# Patient Record
Sex: MALE | Race: WHITE | Employment: OTHER | URBAN - METROPOLITAN AREA
[De-identification: names, ages, dates, MRNs, and addresses within clinical notes are randomized per-mention and may not be internally consistent; named-entity substitution may affect disease eponyms.]

---

## 2021-05-11 ENCOUNTER — HOSPITAL ENCOUNTER (OUTPATIENT)
Dept: PHYSICAL THERAPY | Facility: CLINIC | Age: 71
Setting detail: THERAPIES SERIES
Discharge: HOME | End: 2021-05-11
Payer: MEDICARE

## 2021-05-11 PROCEDURE — 97110 THERAPEUTIC EXERCISES: CPT

## 2021-05-11 PROCEDURE — 97161 PT EVAL LOW COMPLEX 20 MIN: CPT

## 2021-05-11 NOTE — FLOWSHEET NOTE
Melissa Fall Risk Assessment    Patient Name:  Shannan Paul  : 1950        Risk Factor Scale  Score   History of Falls [] Yes  [x] No 25  0    Secondary Diagnosis [] Yes  [x] No 15  0    Ambulatory Aid [] Furniture  [] Crutches/cane/walker  [x] None/bedrest/wheelchair/nurse 30  15  0    IV/Heparin Lock [] Yes  [x] No 20  0    Gait/Transferring [] Impaired  [x] Weak  [] Normal/bedrest/immobile 20  10  0 10   Mental Status [] Forgets limitations  [x] Oriented to own ability 15  0       Total:10     Based on the Assessment score: check the appropriate box.     [x]  No intervention needed   Low =   Score of 0-24    []  Use standard prevention interventions Moderate =  Score of 24-44   [] Give patient handout and discuss fall prevention strategies   [] Establish goal of education for patient/family RE: fall prevention strategies    []  Use high risk prevention interventions High = Score of 45 and higher   [] Give patient handout and discuss fall prevention strategies   [] Establish goal of education for patient/family Re: fall prevention strategies   [] Discuss lifeline / other resources    Electronically signed by:   Ramon Hutchison, PT  Date: 2021

## 2021-05-11 NOTE — CONSULTS
[] [facility]) - [facility] &  Therapy  [address]  [phone]  F: [phone] [] [address]  [address]   [address]  P: [phone]  F: [phone] [] [address] &  Therapy  [address]  P: [phone]  F: [phone] [] [address]  P: [phone]  F: [phone] [] [address]  [facility]   [address]   [place]: [phone]  F: [phone]      Physical Therapy Neurological Evaluation    Date:  2021  Patient: Herber Beasley   : 1950  MRN: [id number]  Physician: Dr Simran Couch: Medicare  Medical Diagnosis: CVA w/ R LE an UE weakness  Rehab Codes: R29.898  Onset date: 21    Next 's appt. : --    Subjective:   CC:R UE and LE weakness   HPI: : sitting outside cooking dinner on the grill. Got up and noticed he couldn't  very well. Also noticed R leg \"lagged\" behind the L. He thought it was low blood sugar. Went to bed that night and nothing had changed overnight. Called PCP and went to hospital asap.  + CVA. Had home PT and OT for 2 wks. Hand was affected with fine motor skills, writing, .  R leg was limited by proximal R hip. He is extimated at 90% back to normal by the [place], which he agrees with. Endurance is the primary limitation with respect to the R LE. Has always enjoyed walking. Can currently walk 1/2 block (10-15 min). Normally go 2 blocks. Most of the difficulty is with the 3 and 4 fingers. Squeezing silly putty. As for the cause, plaque had broken off and traveled to L frontal lobe.       PMHx: [] Unremarkable [] Diabetes [x] HTN-controlled     [] Pacemaker   [] MI/Heart Problems [x] Cancer (melanoma 2005) [x] Arthritis-back, Jeremiah shoulders R is bone on bone, limited flexion to 90 deg, had considered TSA [] Other:              [x] Refer to full medical chart  In EPIC       Comorbidities:   [x] Obesity [] Dialysis  [] N/A   [] Asthma/COPD [] Dementia [] Other:   [] Stroke [] Sleep apnea [] Other:   [] Vascular disease [] Rheumatic disease [] Other:     Tests: [] X-Ray: [] MRI:  [] Other:    Medications: [x] Refer to full medical record [] None [] Other:  Allergies:      [x] Refer to full medical record [] None [] Other:    Function:  Hand Dominance  [x] Right  [] Left  Patient lives with: In what type of home []  One story   [] Two story   [] Split level   Number of stairs to enter    With handrail on the []  Right to enter   [] Left to enter   Bathroom has a []  Tub only  [] Tub/shower combo   [] Walk in shower    []  Grab bars   Washing machine is on []  Main level   [] Second level   [] Basement   Employer    Job Status []  Normal duty   [] Light duty   [] Off due to condition    [x]  Retired   [] Not employed   [] Disability  [] Other:  []  Return to work:    Work activities/duties Walking, craft/hobby work       ADL/IADL Previous level of function Current level of function Who currently assists the patient with task   Bathing  [] Independent  [] Assist [x] Independent  [] Assist    Dress/grooming [] Independent  [] Assist [x] Independent  [] Assist    Transfer/mobility [] Independent  [] Assist [x] Independent  [] Assist    Feeding [] Independent  [] Assist [x] Independent  [] Assist    Toileting [] Independent  [] Assist [x] Independent  [] Assist    Driving [] Independent  [] Assist [x] Independent  [] Assist    Housekeeping [] Independent  [] Assist [x] Independent  [] Assist    Grocery shop/meal prep [] Independent  [] Assist [x] Independent  [] Assist      Gait Prior level of function Current level of function    [] Independent  [] Assist [x] Independent  [] Assist   Device: [] Independent [x] Independent    [] Straight Cane [] Quad cane [] Straight Cane [] Quad cane    [] Standard walker [] Rolling walker   [] 4 wheeled walker [] Standard walker [] Rolling walker   [] 4 wheeled walker    [] Wheelchair [] Wheelchair     Pain:  [] Yes  [x] No Location: Pain Rating: (0-10 scale) ---/10  Pain altered Tx:  [] Yes  [x] No  Action:    Symptoms:  [x] Improving [] Worsening [] Same  Better:  [] AM    [] PM    [] Sit    [] Rise/Sit    []Stand    [] Walk    [] Lying    [] Other:  Worse: [] AM    [] PM    [] Sit    [] Rise/Sit    []Stand    [] Walk    [] Lying    [] Bend                      [] Valsalva    [] Other:  Sleep: [x] OK    [] Disturbed    Objective:    ROM  °A/P STRENGTH POSTURE No deficit Deficit Not Tested    Left Right Left Right Kyphosis [] [] []   Shoulder Flex wfl 90   Scoliosis [] [] []   Abd     Forward Head [] [] []   Elbow Flex     Rounded Shldrs [] [] []   Ext     Slumped Sitting [] [] []   Wrist Flex     Skin Integrity [] [] []   Ext           Hand      NEUROLOGICAL      Hip Flex  90   Reflexes [] [] []   Ext  5   Sensation [] [] []   Abd  15   Bladder/Bowel [] [] []   Hip ER  15   Coordination [] [] []   Hip IR  15   Tone [] [] []   Ankle DF     Clonus [] [] []   PF     Tremors [] [] []     FUNCTION INDEP MIN ASSIST MOD ASSIST MAX ASSIST NOT TESTED   Bed Mobility             -rolling []  []  []  []  []    -sit to supine []  []  []  []  []    -supine to sit []  []  []  []  []    Transfers             -sit to stand []  []  []  []  []    -sliding board []  []  []  []  []    -pivot []  []  []  []  []    Balance             -sitting static []  []  []  []  []    -dynamic []  []  []  []  []    -standing static []  []  [x]  []  []    -dynamic []  []  []  []  []    Ambulation []  []  []  []  []    Distance/Device:    Analysis:     W/C Mobility []  []  []  []  []    Groom/Dress []  []  []  []  []             Functional Test: LEFS Score: 38% functionally impaired     Comments:    Assessment:  Patient would benefit from skilled physical therapy services in order to: improve R LE strength and endurance    Problems:    [] ? Pain:  [] ? ROM:  [x] ? Strength:  [x] ? Function:  [] Other:       STG: (to be met in 4 treatments)  1. ? Strength:able to walk 1 block 3x/wk  2. ? Function: improve LEFS <20% interference  3. Patient to be independent with home exercise program as demonstrated by performance with correct form without cues. LTG: (to be met in 8 treatments)  1. Able to walk 2 blocks without issues  2. LEFS <10% interference      Patient goals: \"strengthen hip\"    Rehab Potential:  [x] Good  [] Fair  [] Poor   Suggested Professional Referral:  [x] No  [] Yes:  Barriers to Goal Achievement:  [x] No  [] Yes:  Domestic Concerns:  [x] No  [] Yes:    Pt. Education:  [x] Plans/Goals, Risks/Benefits discussed  [] Home exercise program  Method of Education: [x] Verbal  [] Demo  [] Written  Comprehension of Education:  [] Verbalizes understanding. [] Demonstrates understanding. [x] Needs Review. [] Demonstrates/verbalizes understanding of HEP/Ed previously given. Treatment Plan:  [x] Therapeutic Exercise   15195  [] Iontophoresis: 4 mg/mL Dexamethasone Sodium Phosphate  mAmin  50103   [] Therapeutic Activity  34605 [] Vasopneumatic cold with compression  00392    [] Gait Training   53307 [] Ultrasound   38992   [x] Neuromuscular Re-education  06297 [] Electrical Stimulation Unattended  89429   [x] Manual Therapy  14290 [] Electrical Stimulation Attended  34680   [x] Instruction in HEP  [] Lumbar/Cervical Traction  58717   [] Aquatic Therapy   84584 [] Cold/hotpack    [] Massage   92112      [] Dry Needling, 1 or 2 muscles  01557   [] Biofeedback, first 15 minutes   22901  [] Biofeedback, additional 15 minutes   82818 [] Dry Needling, 3 or more muscles  45055     []  Medication allergies reviewed for use of    Dexamethasone Sodium Phosphate 4mg/ml     with iontophoresis treatments. Pt is not allergic.       Frequency: 2x/weeks for 8 visits    [place] Treatment:  Modalities: none  Precautions: R shoulder limited ROM and is bone on bone. Limited R hip ROM. Arthritis in his back  Exercises:  Exercise Reps/ Time Weight/ Level Issued to HEP Completed Comments   Tasia 5'   x            Supine        Bridges *       SL hip abd *               Gym        Total Gym R squats 2x10 L13  x 2 legs/R leg   // bar ex *       Lunges 2x6   x    Lateral step ups *       Posterior step ups *       Heel taps *       Balance  *       4 way hp  2x10 blue  x    Other:    Specific Instructions for next treatment: add ex for balance, strength, and endurance to R LE    Evaluation Complexity:  History (Personal factors, comorbidities) [x] 0 [] 1-2 [] 3+   Exam (limitations, restrictions) [x] 1-2 [] 3 [] 4+   Clinical presentation (progression) [x] Stable [] Evolving  [] Unstable   Decision Making [x] Low [] Moderate [] High    [x] Low Complexity [] Moderate Complexity [] High Complexity       Treatment Charges: Mins Units   [x] Evaluation       [x]  Low       []  Moderate       []  High  1   []  Modalities     [x]  Ther Exercise 20 1   []  Manual Therapy     []  Ther Activities     []  Aquatics     []  Vasocompression     []  Other       TOTAL TREATMENT TIME: 60    Time in:1403      Time out: 1510    Electronically signed by: Keesha Middleton PT        Physician Signature:________________________________Date:__________________  By signing above or cosigning this note, I have reviewed this plan of care and certify a need for medically necessary rehabilitation services.      *PLEASE SIGN ABOVE AND FAX BACK ALL PAGES*

## 2021-05-11 NOTE — CARE COORDINATION
Medicare Cap   [x] Physical Therapy  [] Speech Therapy  [] Occupational therapy  *PT and Speech caps combine      $2100 Limit for PT and Speech combined  $2100 Limit for OT individually  At the beginning of the month where you expect to go over $2100, please add the [address] [place] modifier      Patient Name: Patricia Elliott  YOB: 1950    Note:  This is an estimate of charges billed.      Date of [address] Name # units/ charge $$$ charge Daily Total Charge Ongoing Total $$$   5/1121 libertad ARITA 1/1 82.82+23.22 106.04 106.04

## 2021-05-13 ENCOUNTER — HOSPITAL ENCOUNTER (OUTPATIENT)
Dept: OCCUPATIONAL THERAPY | Facility: CLINIC | Age: 71
Setting detail: THERAPIES SERIES
Discharge: HOME | End: 2021-05-13
Payer: MEDICARE

## 2021-05-13 PROCEDURE — 97110 THERAPEUTIC EXERCISES: CPT

## 2021-05-13 PROCEDURE — 97165 OT EVAL LOW COMPLEX 30 MIN: CPT

## 2021-05-13 NOTE — CONSULTS
[] [facility] Outpatient       Occupational Therapy 1st floor       [address], [place]         Phone: [phone]       Fax: [phone] [x] [place] [address] Occupational Therapy  [address], [place]  Phone: [phone]  Fax: [phone]       Occupational Therapy Hand & Upper Extremity  Initial Evaluation    Date: 2021      Patient: Rabia Hutchinson  : 1950  MRN: [id number]  Referring Provider:  Lesa Guy MD  Insurance: Medicare - BMN, calendar year, [facility] supplement - follow all medicare guidelines, dual insurance, no pt liability. Medical Diagnosis: Weakness of extremity, M62.81.  (right upper extremity)   Rehab Codes: Fine motor skills loss R29.818, muscle weakness generalized M62.81. Onset Date: 21    Next Dr. Susan Deras: 21  #Visits/Total Visits: 1/4  1 time a week for 4 visits. Cancels/No Shows:  0/0    Subjective:   CC: Have trouble with positioning eating utensils, painting, legibility of writing, putting on left sock, slower doing things than normal.  HPI: (onset date: 21)  Ischemic stroke by pt report, affecting right side. Leg gets fatigued before the arm. Surgery Date: NA      Involved Extremity: Right   Dominant Extremity: Right    Past Medical History: Diabetes, Cancer, HTN and Arthritis          Comorbidities: Obesity and Stroke    Medications: Metformin, Latanoprost, Lisinopril, Lipitor, Plavix. Allergies:  None    Pain: Intensity:  0 /10 Location: NA     Pain Type: NA  Pain Altered Tx: NA  Action Taken: NA            Home Environment:    Pt lives in a House With 3 [address]HE[address] and R Handrail   The washing machine is on the main level    Vocation NA   Job Status []Normal duty []Light duty [] Off due to condition [x]Retired  []Not employed []Disability  []Other:  []  Return to work:    Work activities/duties      Avocational Activities Fishing, boating, walking, castro, bird watching, restoring [employer]    [] Young Children     [x] Grandparenting - 3, ages 4-5, out of town     [] Care giver [] OTHER    [] NA       ADL/IADL Previous level of function Current level of function Who assists or modifications made or needed   Bathing  [x] Independent    [] Assist  [x] Independent    [] Assist     Dress/grooming [x] Independent    [] Assist  [] Independent    [x] Assist  Wife - Occasional assistance with a sock due to time issue. Transfer/mobility [x] Independent    [] Assist  [x] Independent    [] Assist     Feeding [x] Independent    [] Assist  [x] Independent    [] Assist     Toileting [x] Independent    [] Assist  [x] Independent    [] Assist     Driving [x] Independent    [] Assist  [x] Independent    [] Assist     Housekeeping [x] Independent    [] Assist  [x] Independent    [] Assist     Grocery shop/meal prep [x] Independent    [] Assist  [x] Independent    [] Assist       Device: Independent   Orthosis: NA    Objective: Tests/Measurements:                                     Torres Fall Risk Assessment  Risk Factor Scale  Score   History of Falls [] Yes  [x] No 25  0 0   Secondary Diagnosis [] Yes  [x] No 15  0 0   Ambulatory Aid [] Furniture  [] Crutches/cane/walker  [x] None/bedrest/wheelchair/nurse 30  15  0 0   IV/Heparin Lock [] Yes  [x] No 20  0 0   Gait/Transferring [] Impaired  [] Weak  [x] Normal/bedrest/immobile 20  10  0 0   Mental Status [] Forgets limitations  [x] Oriented to own ability 15  0 0      Total:  0     Based on the Assessment score: check the appropriate box. [x]  No intervention needed   Low =   Score of 0-24      Upper Extremity Functional Index:  Current Functional Level:  71 points, mild functional impairment as measured with the Upper Extremity Functional Index Survey. 0-80 scale, with 80 = no Deficits  (The UEFI model does not provide any specific cut off points that could classify the upper limb disability degree, however, a minimal detectable change of 9 points is provided. This means that for improvement or deterioration to be considered, between two subsequent evaluations, the scores must differ by at least 9 points.)    STRENGTH:    Pounds RIGHT LEFT    74 76.6   Lateral pinch 23 24   2 point pinch 16 16   3 jaw pinch 20 20     The affected  is 3.4 % weaker than the unaffected . (affected score/unaffected score, take the total and subtract from 100)    Manual Muscle Test, Right Upper Extremity:   5-/5 shoulder and elbow flexion, 4/5 all right finger ab- and adduction. Remainder of right upper extremity 5/5 (WNL). COORDINATION  - Fine Motor (speed/dexterity):   Right in seconds Percentile Left in seconds Percentile   9 Hole Peg Test 29.50  Greater than the 10th percentile 23.41  Greater than the 75th percentile       AROM:  DIGITS   Right       Extension/Flexion  Degrees INDEX MIDDLE RING LITTLE   MCP -10/WNL +12/WNL   -5/WNL +10/WNL   PIP   -5/WNL    -2/WNL -15/WNL  -20/WNL   DIP    0/WNL    -4/WNL   -3/WNL   +9/WNL    Full fist Full fist Full Fist Full fist   Extensor lag noted in index and ring MCP joints, all PIP joints, and the DIP joints of the middle and ring fingers. AROM: Right elbow and forearm WNL. Right shoulder limited ROM and is bone on bone from unrelated condition    Sensibility: Normal    Edema: None      Skin Color: Slight pink mottling of palm Skin/Scar condition Not tested    Problems: ROM, Strength, Function and Coordination     Assessment:  Patient would benefit from skilled occupational therapy services in order to: Improve and Increase  ROM, Strength, Activity tolerance and Coordination deficit in order to improve functional use of UE in ADL performance and return to PLOF. Short Term Goals: (  4    Treatments)  1. Increase AROM, Right:  a. Index and ring MCP joint extension will be 0 degrees (WNL). b. Index and middle finger PIP extension will be 0 degrees (WNL)  c. Ring and little PIP extension will be -5 or less degrees.   d. Middle and ring DIP extension will be 0 degrees (WNL). 2. Increase strength (pounds): Right  strength will be 77 or more pounds or symmetrical with the left . 3. Increase function: UE Functional Index Score will be 78 or more points to promote increased functional abilities. 4. Patient to be independent with home exercise program as demonstrated by performance with correct form without cues. Long Term Goals: NA    Patient Goals: Fine motor skills - hand. Treatment Potential: Good   Suggested Professional Referral: No  Domestic Concerns: No   Barriers to Goal Achievement: No    Comments/Assessment:  Pt is s/p CVA 04/05/21, approximately 5.5 weeks ago. Pt referred to occupational therapy services for  therapy for fine motor skills and writing. He estimates he has recovered 90%. Physical therapy is addressing right hip issues. Right affected  strength is 3.4% weaker than the unaffected left . Pinches are symmetrical or within 1 pound bilaterally. Manual muscle test of the right upper extremity unremarkable except finger intrinsic muscles (digit ab- and adduction) which test at 4/5. Right hand fine motor coordination as measured with the 9 Hole Peg test at greater than the 10th percentile, which is significantly lower than the left  Hand at greater than the 75th percentile. Unremarkable for sensory deficits and edema of the right upper extremity.   Plan is to see pt once weekly for 4 visits, with emphasis on home exercise program for fine motor and strengthening program.    Home Program Initiated: Written, Verbal and Demo Comprehension of Education: Yes       Plans, Goals, Risks, Benefits, Discussed with and Patient    Treatment Plan:  Therapeutic Ex 07214, Manual 32768 and Ultrasound [id number]    Treatment Plan:  Frequency: 1 x/weeks for 4 visits     Today's Treatment:  Modalities:                Precautions: R shoulder limited ROM and is bone on bone    Exercise Flow Sheet:  Exercise Reps/Time Weight/Level Comments   Fine motor/dexterity exercises   Completed  Pt education provided: written, verbal, demo. Resistive /pinches with putty 10 reps each [place] Completed  Pt education provided: written, verbal, demo. Functional fine motor ex, including timed ex as appropriate   Planned for next session                     Other: NA    Specific Instructions for Next Treatment: Review home ex program, initiate functional fine motor ex for clinic program.    Evaluation Complexity:  History (Personal factors, comorbidities) [x]  0 []  1-2 []  3+   Exam (limitations, restrictions) [x]  1-2 []  3 []  4+   Decision Making [x]  Low []  Moderate []  High   ? [x]  Low Complexity []  Moderate   Complexity []  High Complexity      Treatment Charges: Mins Units Time In/Out   Evaluation  []  High  []  Moderate  [x]  Low  28  1 1050 - 1118   [x]  Ther Exercise 53 4 1118 - 1211   []  Manual Therapy      []  Ther Activities      []  Orthotic fit/train      []  Orthotic recheck      []        Total Treatment time 81 min             Medicare Cap   [] Physical Therapy  [] Speech Therapy  [x] Occupational therapy  *PT and Speech caps combine      $2100 Limit for PT and Speech combined  $2100 Limit for OT individually  At the beginning of the month where you expect to go over $2100, please add the [address] [place] modifier      Patient Name: Gail Vargas  YOB: 1950    Note:  This is an estimate of charges billed. Date of [facility] 63 Name # units/ charge $$$ charge Daily Total Charge Ongoing Total $$$   05/13/21 OT Eval - Low 1 87.69      Th Ex  4 23.22 x 4 180.57 180.57                       Time In/Time Out: 1050 - 1211       Electronically signed by CAROLINE Landon/L, CHT on 5/13/2021 at 11:01 AM    Physician Signature: _________________________ Date: _______________  By signing above or cosigning this note, I have reviewed this plan of care and certify a need for medically necessary rehabilitation services.      *PLEASE SIGN ABOVE AND

## 2021-05-18 ENCOUNTER — HOSPITAL ENCOUNTER (OUTPATIENT)
Dept: PHYSICAL THERAPY | Facility: CLINIC | Age: 71
Setting detail: THERAPIES SERIES
Discharge: HOME | End: 2021-05-18
Payer: MEDICARE

## 2021-05-18 PROCEDURE — 97110 THERAPEUTIC EXERCISES: CPT

## 2021-05-18 NOTE — FLOWSHEET NOTE
Endurance to ex was good. Transfers were limited due to decreased core strength and overall deconditioning. [] No change. [] Other:  [] Patient would continue to benefit from skilled physical therapy services in order to:     STG/LTG  STG: (to be met in 4 treatments)  1. ? Strength:able to walk 1 block 3x/wk  2. ? Function: improve LEFS <20% interference  3. Patient to be independent with home exercise program as demonstrated by performance with correct form without cues.     LTG: (to be met in 8 treatments)  1. Able to walk 2 blocks without issues  2. LEFS <10% interference      Patient goals: \"strengthen hip\"    Pt. Education:  [x] Yes  [] No  [] Reviewed Prior HEP/Ed  Method of Education: [] Verbal  [x] Demo  [x] Written  Comprehension of Education:  [] Verbalizes understanding. [] Demonstrates understanding. [x] Needs review. [] Demonstrates/verbalizes HEP/Ed previously given. Plan: [x] Continue current frequency toward long and short term goals. [x] Specific Instructions for subsequent treatments:   To improve balance and strength      Time In: 1001           Time Out: 1058    Electronically signed by:  Rocky Collins, PT

## 2021-05-18 NOTE — CARE COORDINATION
Medicare Cap   [x] Physical Therapy  [] Speech Therapy  [] Occupational therapy  *PT and Speech caps combine      $2100 Limit for PT and Speech combined  $2100 Limit for OT individually  At the beginning of the month where you expect to go over $2100, please add the [address] [place] 22 modifier      Patient Name: Leonor Watters  YOB: 1950    Note:  This is an estimate of charges billed.      Date of [address] Name # units/ charge $$$ charge Daily Total Charge Ongoing Total $$$   5/1121 IE, libertad 1/1 82.82+23.22 106.04 106.04   5/18 libertad 3 29.72+23.22*2 76.16 182.20

## 2021-05-19 ENCOUNTER — HOSPITAL ENCOUNTER (OUTPATIENT)
Age: 71
Setting detail: SPECIMEN
Discharge: HOME | End: 2021-05-19
Payer: MEDICARE

## 2021-05-19 LAB
ANION GAP SERPL CALCULATED.3IONS-SCNC: 13 MMOL/L (ref 9–17)
BUN BLDV-MCNC: 16 MG/DL (ref 8–23)
BUN/CREAT BLD: ABNORMAL (ref 9–20)
CALCIUM SERPL-MCNC: 9.6 MG/DL (ref 8.6–10.4)
CHLORIDE BLD-SCNC: 103 MMOL/L (ref 98–107)
CO2: 23 MMOL/L (ref 20–31)
CREAT SERPL-MCNC: 0.99 MG/DL (ref 0.7–1.2)
ESTIMATED AVERAGE GLUCOSE: 154 MG/DL
GFR AFRICAN AMERICAN: >60 ML/MIN
GFR NON-AFRICAN AMERICAN: >60 ML/MIN
GFR SERPL CREATININE-BSD FRML MDRD: ABNORMAL ML/MIN/{1.73_M2}
GFR SERPL CREATININE-BSD FRML MDRD: ABNORMAL ML/MIN/{1.73_M2}
GLUCOSE FASTING: 121 MG/DL (ref 70–99)
HBA1C MFR BLD: 7 % (ref 4–6)
POTASSIUM SERPL-SCNC: 4.1 MMOL/L (ref 3.7–5.3)
SODIUM BLD-SCNC: 139 MMOL/L (ref 135–144)

## 2021-05-20 ENCOUNTER — HOSPITAL ENCOUNTER (OUTPATIENT)
Dept: OCCUPATIONAL THERAPY | Facility: CLINIC | Age: 71
Setting detail: THERAPIES SERIES
Discharge: HOME | End: 2021-05-20
Payer: MEDICARE

## 2021-05-20 PROCEDURE — 97110 THERAPEUTIC EXERCISES: CPT

## 2021-05-20 NOTE — FLOWSHEET NOTE
[] [place]       Occupational Therapy            1st floor       [address], [place]         Phone: [phone]       Fax: [phone] [x] [facility] Occupational Therapy  [address]  [place], [place]  Phone: [phone]  Fax: [phone]      Occupational Therapy Daily Treatment Note    Date:  2021  Patient Name:  Marybeth Mendoza    :  1950  MRN: [id number]  Referring Provider:  Jan Prince MD  Insurance: Medicare - BMN, calendar year, AARP/[facility] supplement - follow all medicare guidelines, dual insurance, no pt liability.      Medical Diagnosis: Weakness of extremity, M62.81.  (right upper extremity)          Rehab Codes: Fine motor skills loss R29.818, muscle weakness generalized M62.81.      Onset Date: 21               Next Dr. Enciso Quiver: 21  #Visits/Total Visits: 2/4  1 time a week for 4 visits. Progress note for Medicare patient due at visit 4  Cancels/No Shows:  0/0      Subjective:    Pain:  [] Yes  [x] No Location: N/A Pain Rating: (0-10 scale) 0/10  Pain altered Tx:  [x] No  [] Yes  Action: NA  Pt Comments: \"I think I've been doing the exercises okay. \"    Objective: Today's Treatment:  Modalities:                                 Precautions: R shoulder limited ROM and is bone on bone               Exercise Flow Sheet:  Exercise Reps/Time Weight/Level Comments   Fine motor/dexterity exercises     Completed  Reviewed home ex, pt indep. Goal MET   Resistive /pinches with putty 10 reps each Coral Completed  Reviewed home ex, pt indep.   Goal MET   Functional fine motor ex, including timed ex as appropriate     Planned for next session   Writing ex - with pen       Issued large pen  for pt use at home.     Grooved pegboard 25 in/25 out    Added    Cephus Paddy - radial and ulnar release As tolerated    Added    Nuts and bolts assembly/  disassembly 4 reps  Added   O'Mac dexterity with multiple small pegs 20 reps  Added   Other: Reviewed putty ex's, pt indep in all ex, coral putty still appropriate resistance.     Specific Instructions for Next Treatment: Review home ex program, initiate functional fine motor ex for clinic program. Further discussed witting ex, pt expressed frustration with legibility. Pt tried a number of enlarged pens and pen  until he found one that felt the best in his hand and produced the most legible signature. Pen  issued for pt use. Added multiple fine motor coordination tasks to clinic program. Pt reported fatigue at end of session. Treatment Charges: Mins Units Time In/Out   []  Modalities:       []  Ultrasound      [x]  Ther Exercise 47 3 7878 - 5361   []  Manual Therapy      []  Ther Activities      []  Orthotic fit/train      []  Orthotic recheck      []  Other      Total Treatment time 47 min           Assessment: [x] Progressing toward goals. [] No change. [] Other     [x] Patient would continue to benefit from skilled occupational therapy services in order to: Improve and Increase  ROM, Strength, Activity tolerance and Coordination deficit in order to improve functional use of UE in ADL performance and return to PLOF. Short Term Goals: (  4    Treatments)  1. Increase AROM, Right:  a. Index and ring MCP joint extension will be 0 degrees (WNL). b. Index and middle finger PIP extension will be 0 degrees (WNL)  c. Ring and little PIP extension will be -5 or less degrees. d. Middle and ring DIP extension will be 0 degrees (WNL). 2. Increase strength (pounds): Right  strength will be 77 or more pounds or symmetrical with the left . 3. Increase function: UE Functional Index Score will be 78 or more points to promote increased functional abilities. 4. Patient to be independent with home exercise program as demonstrated by performance with correct form without cues.     Long Term Goals: NA     Patient Goals: Fine motor skills - hand.     Pt. Education:  [] Yes  [] No  [x] Reviewed Prior HEP/Ed  Method of Education: [x] Verbal  [] Demo  [] Written  Re: Fine motor coordination, putty, resistive intrinsic ex  Comprehension of Education:  [] Verbalizes understanding. [] Demonstrates understanding. [] Needs review. [x] Demonstrates/verbalizes HEP/Ed previously given. Plan: [x] Continue current frequency toward short and long term goals. [x] Specific Instructions for subsequent treatments: Identify areas requiring activity modification, progress fine motor activities.    [] Other:       Time In/Time Out: 6447 - 2872       Electronically signed by:  Gregory Adame OTR/HUNTER, CHT

## 2021-05-21 ENCOUNTER — HOSPITAL ENCOUNTER (OUTPATIENT)
Dept: PHYSICAL THERAPY | Facility: CLINIC | Age: 71
Setting detail: THERAPIES SERIES
Discharge: HOME | End: 2021-05-21
Payer: MEDICARE

## 2021-05-21 PROCEDURE — 97110 THERAPEUTIC EXERCISES: CPT

## 2021-05-21 NOTE — FLOWSHEET NOTE
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 : --  Visit# / total visits:  Progress note for Medicare patient due at visit 10     Cancels/No Shows: 0/0    Subjective:    Pain:  [] Yes  [x] No Location:  N/A Pain Rating: (0-10 scale) 0/10  Pain altered Tx:  [x] No  [] Yes  Action:  Comments: Pt reported no pain or recent falls. Stated that he was a little wobbly during some activities last date. Objective:  Modalities: none  Precautions: R shoulder limited ROM and is bone on bone. Limited R hip ROM.   Arthritis in his back  Exercises:  Exercise Reps/ Time Weight/ Level Issued to HEP Completed Comments   Tasia 5'     x                   Supine             Bridges 20   5/18 x     SL hip abd 2x10   5/18 x                   Gym             Total Gym R squats/calf raises/heel raises 15x L15   x Added heel and toe raises   // bar ex 15x      x marching   Lunges 15x     x     Lateral step ups 15x 4\" 5/18 x     Posterior step ups 15x 4\" 5/18 x     Heel taps 15x 4\"   x     Tandem balance 2x20\" ea     x     SLS 2x20\" ea   x    4 way hp  15x ea orange   x bilateral   Other:     Specific Instructions for next treatment: add ex for balance, strength, and endurance to R LE     Treatment Charges: Mins Units   []  Modalities     [x]  Ther Exercise 45 3   []  Manual Therapy     []  Ther Activities     []  Aquatics     []  Vasocompression     []  Other     Total Treatment time 45 3       Assessment: [x] Progressing toward goals. Added SLS and tandem balance with moderate tolerance, pt able to perform without any major loses of balance was able to self correct all weaknesses. Pt noted weakness in LE strength but was able to perform all reps above with minor to no fatigue. [] No change. [] Other:  [] Patient would continue to benefit from skilled physical therapy services in order to:     STG/LTG  STG: (to be met in 4 treatments)  1. ? Strength:able to walk 1 block 3x/wk  2. ? Function: improve LEFS <20% interference  3. Patient to be independent with home exercise program as demonstrated by performance with correct form without cues.     LTG: (to be met in 8 treatments)  1. Able to walk 2 blocks without issues  2. LEFS <10% interference      Patient goals: \"strengthen hip\"    Pt. Education:  [x] Yes  [] No  [] Reviewed Prior HEP/Ed  Method of Education: [] Verbal  [x] Demo  [x] Written  Comprehension of Education:  [] Verbalizes understanding. [] Demonstrates understanding. [x] Needs review. [] Demonstrates/verbalizes HEP/Ed previously given. Plan: [x] Continue current frequency toward long and short term goals. [x] Specific Instructions for subsequent treatments:   To improve balance and strength      Time In: 1000           Time Out: 1050    Electronically signed by:  Rena Fowler PTA

## 2021-05-21 NOTE — CARE COORDINATION
Medicare Cap   [x] Physical Therapy  [] Speech Therapy  [] Occupational therapy  *PT and Speech caps combine      $2100 Limit for PT and Speech combined  $2100 Limit for OT individually  At the beginning of the month where you expect to go over $2100, please add the [address] [place] [address] modifier      Patient Name: Minor Rocha  YOB: 1950    Note:  This is an estimate of charges billed.      Date of [address] Name # units/ charge $$$ charge Daily Total Charge Ongoing Total $$$   5/1121 IE, libertad 1/1 82.82+23.22 106.04 106.04   5/18 libertad 3 29.72+23.22*2 76.16 182.20   5/21 PTA TE 3 25.26+19.74*2 64.74 246.94

## 2021-05-24 ENCOUNTER — HOSPITAL ENCOUNTER (OUTPATIENT)
Dept: PHYSICAL THERAPY | Facility: CLINIC | Age: 71
Setting detail: THERAPIES SERIES
Discharge: HOME | End: 2021-05-24
Payer: MEDICARE

## 2021-05-24 PROCEDURE — 97110 THERAPEUTIC EXERCISES: CPT

## 2021-05-24 NOTE — FLOWSHEET NOTE
[]  Aquatics     []  Vasocompression     []  Other     Total Treatment time 45 3       Assessment: [x] Progressing toward goals. Held B hip strengthening this date d/t incr pain after last visit, however worked on R LE strengthening with incr reps with total gym and mat exercises with good isis. Cued pt on post pelvic tilt with bridges, d/t c/o LBP, pt denies pain with PPT. Able to incr step height to 6\" with good isis. Pt cont to demo difficulty with SLS stance bilaterally. Good tolerance to interventions this date. [] No change. [] Other:  [] Patient would continue to benefit from skilled physical therapy services in order to:     STG/LTG  STG: (to be met in 4 treatments)  1. ? Strength:able to walk 1 block 3x/wk  2. ? Function: improve LEFS <20% interference  3. Patient to be independent with home exercise program as demonstrated by performance with correct form without cues.     LTG: (to be met in 8 treatments)  1. Able to walk 2 blocks without issues  2. LEFS <10% interference      Patient goals: \"strengthen hip\"    Pt. Education:  [x] Yes  [] No  [] Reviewed Prior HEP/Ed  Method of Education: [] Verbal  [x] Demo  [x] Written  Comprehension of Education:  [] Verbalizes understanding. [] Demonstrates understanding. [x] Needs review. [] Demonstrates/verbalizes HEP/Ed previously given. Plan: [x] Continue current frequency toward long and short term goals. [x] Specific Instructions for subsequent treatments:   To improve balance and strength      Time In: 10:00am           Time Out: 10:50am    Electronically signed by:  Thane Baumgarten, PTA

## 2021-05-27 ENCOUNTER — HOSPITAL ENCOUNTER (OUTPATIENT)
Dept: OCCUPATIONAL THERAPY | Facility: CLINIC | Age: 71
Setting detail: THERAPIES SERIES
Discharge: HOME | End: 2021-05-27
Payer: MEDICARE

## 2021-05-27 PROCEDURE — 97110 THERAPEUTIC EXERCISES: CPT

## 2021-05-27 NOTE — FLOWSHEET NOTE
[] [place]       Occupational Therapy            1st floor       [address], [place]         Phone: [phone]       Fax: [phone] [x] [facility] Occupational Therapy  [address]  [place], [place]  Phone: [phone]  Fax: [phone]      Occupational Therapy Daily Treatment Note    Date:  2021  Patient Name:  Marybeth Mendoza    :  1950  MRN: [id number]  Referring Provider:  Jan Prince MD  Insurance: Medicare - BMN, calendar year, AARP/[facility] supplement - follow all medicare guidelines, dual insurance, no pt liability.      Medical Diagnosis: Weakness of extremity, M62.81.  (right upper extremity)          Rehab Codes: Fine motor skills loss R29.818, muscle weakness generalized M62.81.      Onset Date: 21               Next Dr. Enciso Quiver: 21  #Visits/Total Visits: 3/4  1 time a week for 4 visits. Progress note for Medicare patient due at visit 4  Cancels/No Shows:  0/0      Subjective:    Pain:  [] Yes  [x] No Location: N/A Pain Rating: (0-10 scale) 0/10  Pain altered Tx:  [x] No  [] Yes  Action: NA  Pt Comments: \"I. \"    Objective:   Today's Treatment:  Modalities:                                 Precautions: R shoulder limited ROM and is bone on bone               Exercise Flow Sheet:  Exercise Reps/Time Weight/Level Comments   Fine motor/dexterity exercises     Home ex completed at home     Resistive /pinches with putty 10 reps each Sissy Decent  (coral/green combo) Increased resistance     Writing ex - with pen       Large pen  for pt use at home, reported to be of benefit- increased legibility.     Grooved pegboard  Timed ex 25 pegs   Added timed ex  Finished in 1 minute, 36.28 seconds    Marbles - radial and ulnar release 5 handfuls each    Completed   Nuts and bolts assembly/  disassembly 5 reps  Increased 1 reps   O'Mac dexterity with multiple small pegs 30 reps  Increased reps   Resistive finger maze 1 series  Added   Other: Reviewed putty ex's, pt indep in all ex, coral putty still appropriate resistance.     Specific Instructions for Next Treatment: Progress note/measurements next session. Treatment Charges: Mins Units Time In/Out   []  Modalities:       []  Ultrasound      [x]  Ther Exercise 47 5 8780 - 8175   []  Manual Therapy      []  Ther Activities      []  Orthotic fit/train      []  Orthotic recheck      []  Other      Total Treatment time 47 min       Assessment: [x] Progressing toward goals. Pt reports significant improvement in hand writing legibility with the use of large pen  issued last session. Ex's advanced/added as tolerated to promote fine motor speed/dexterity. [] No change. [] Other     [x] Patient would continue to benefit from skilled occupational therapy services in order to: Improve and Increase  ROM, Strength, Activity tolerance and Coordination deficit in order to improve functional use of UE in ADL performance and return to PLOF. Short Term Goals: (  4    Treatments)  1. Increase AROM, Right:  a. Index and ring MCP joint extension will be 0 degrees (WNL). b. Index and middle finger PIP extension will be 0 degrees (WNL)  c. Ring and little PIP extension will be -5 or less degrees. d. Middle and ring DIP extension will be 0 degrees (WNL). 2. Increase strength (pounds): Right  strength will be 77 or more pounds or symmetrical with the left . 3. Increase function: UE Functional Index Score will be 78 or more points to promote increased functional abilities. 4. Patient to be independent with home exercise program as demonstrated by performance with correct form without cues.     Long Term Goals: NA     Patient Goals: Fine motor skills - hand. Pt. Education:  [] Yes  [x] No  [] Reviewed Prior HEP/Ed  Method of Education: [] Verbal  [] Demo  [] Written  Re:   Comprehension of Education:  [] Verbalizes understanding. [] Demonstrates understanding. [] Needs review.   [] Demonstrates/verbalizes HEP/Ed previously given. Plan: [x] Continue current frequency toward short and long term goals. [x] Specific Instructions for subsequent treatments:  Progress fine motor activities. [] Other:         Medicare Cap   [] Physical Therapy  [] Speech Therapy  [x] Occupational therapy  *PT and Speech caps combine      $2100 Limit for PT and Speech combined  $2100 Limit for OT individually  At the beginning of the month where you expect to go over $2100, please add the [address] [place] [address] modifier      Patient Name: Marybeth Mendoza  YOB: 1950    Note:  This is an estimate of charges billed.                 Date of [address] Name # units/ charge $$$ charge Daily Total Charge Ongoing Total $$$   05/13/21 OT Eval - Low 1 87.69         Th Ex  4 23.22 x 4 180.57 180.57   05/20/21 Th Ex 3 29.72 +23.22 x 2    76.16 256.73   05/27/21 Th Ex  3 29.72 +23.22 x 2    76.16 332.89                        Time In/Time Out: 1100 - 1146       Electronically signed by:  CAROLINE Dominguez/HUNTER, CHT

## 2021-05-28 ENCOUNTER — HOSPITAL ENCOUNTER (OUTPATIENT)
Dept: PHYSICAL THERAPY | Facility: CLINIC | Age: 71
Setting detail: THERAPIES SERIES
Discharge: HOME | End: 2021-05-28
Payer: MEDICARE

## 2021-05-28 PROCEDURE — 97110 THERAPEUTIC EXERCISES: CPT

## 2021-05-28 NOTE — CARE COORDINATION
Medicare Cap   [x] Physical Therapy  [] Speech Therapy  [] Occupational therapy  *PT and Speech caps combine      $2100 Limit for PT and Speech combined  $2100 Limit for OT individually  At the beginning of the month where you expect to go over $2100, please add the [address] [place] [address] modifier      Patient Name: Angel Parra  YOB: 1950    Note:  This is an estimate of charges billed.      Date of [address] Name # units/ charge $$$ charge Daily Total Charge Ongoing Total $$$   5/1121 IE, libertad 1/1 82.82+23.22 106.04 106.04   5/18 libertad 3 29.72+23.22*2 76.16 182.20   5/21 PTA TE 3 25.26+19.74*2 64.74 246.94   5/24/21(PTA) 3TE 3 25.26+19.74*2 64.74 311.68   5/28/21 3TE 3  64.74 376.42

## 2021-06-01 ENCOUNTER — HOSPITAL ENCOUNTER (OUTPATIENT)
Dept: PHYSICAL THERAPY | Facility: CLINIC | Age: 71
Setting detail: THERAPIES SERIES
Discharge: HOME | End: 2021-06-01
Payer: MEDICARE

## 2021-06-01 PROCEDURE — 97110 THERAPEUTIC EXERCISES: CPT

## 2021-06-01 NOTE — FLOWSHEET NOTE
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 : --  Visit# / total visits:  Progress note for Medicare patient due at visit 10     Cancels/No Shows: 0/0    Subjective:    Pain:  [] Yes  [x] No Location:  N/A  Pain Rating: (0-10 scale) 0/10  Pain altered Tx:  [x] No  [] Yes  Action:  Comments: Pt denies any pain upon arrival this date. No complaints or changes since last session. Mentions compliance with HEP. Objective:  Modalities: none  Precautions: R shoulder limited ROM and is bone on bone. Limited R hip ROM.   Arthritis in his back  Exercises:  Exercise Reps/ Time Weight/ Level Issued to HEP Completed Comments   Tasia 6'     x                   Supine             Bridges 2x10   5/18 x  cues for TA  To stabilize lumbar spine    SL hip abd 2x10   5/18 x     SAQ 2x10   x Soccer ball under knee            Prone        Hip Extension 2x10 ea   x Knee ext                         Gym             Leg Press (SL/DL) 2x10 ea   x 30# DL, 10# SL   Total Gym R squats/calf raises/heel raises 2x10 L16    Added heel and toe raises- DL   Total gym DL squats  2x10 L19      // bar ex          Static Marches 2x10    No UE support    Lunges 2x10          Fwd step ups 10x ea    Staircase, skip a step   Lateral step ups 15x 6\" 5/18 x     Posterior step ups 15x 6\" 5/18 x     Heel taps-lateral 15x 6\"   x     Tandem balance 2x20\" ea          SLS 2x30\" ea   x    4 way hip  15x ea lime   x bilateral   Rebounder toss w/ gait belt 10xea lil red ball  x Double leg fwd and B Modified tandem stance                     Other:     Specific Instructions for next treatment: add ex for balance, strength, and endurance to R LE      Treatment Charges: Mins Units   []  Modalities     [x]  Ther Exercise 50 3   []  Manual Therapy     []  Ther Activities     []  Aquatics     []  Vasocompression     []  Other     Total Treatment time 50 3   [address] 6' not billed for     Assessment: [x] Progressing toward goals. Pt notes pulling at anterior aspect of R hip with prone hip extension. Progressed hold time to 30 seconds for SLS this date and completed with intermittent unilateral UE support on bars as needed to maintain balance. Vc's to engage core with mat exercises and resisted 3 way hip to increase core/lumbar stabilization. Addition of chest pass to rebounder in modified tandem stance with gait belt donned for safety to further challenge balance. Pt with good tolerance to progressions, will cont to progress pt as tolerated. [] No change. [] Other:  [] Patient would continue to benefit from skilled physical therapy services in order to:     STG/LTG  STG: (to be met in 4 treatments)  1. ? Strength:able to walk 1 block 3x/wk  2. ? Function: improve LEFS <20% interference  3. Patient to be independent with home exercise program as demonstrated by performance with correct form without cues.     LTG: (to be met in 8 treatments)  1. Able to walk 2 blocks without issues  2. LEFS <10% interference      Patient goals: \"strengthen hip\"    Pt. Education:  [x] Yes  [] No  [] Reviewed Prior HEP/Ed  Method of Education: [x] Verbal  [x] Demo rebounder  [] Written  Comprehension of Education:  [] Verbalizes understanding. [] Demonstrates understanding. [x] Needs review. [x] Demonstrates/verbalizes HEP/Ed previously given. Plan: [x] Continue current frequency toward long and short term goals.     [x] Specific Instructions for subsequent treatments:   To improve balance and strength      Time In: 9:04am      Time Out:  10:00 am    Electronically signed by:  Misael Manriquez PTA

## 2021-06-01 NOTE — CARE COORDINATION
Medicare Cap   [x] Physical Therapy  [] Speech Therapy  [] Occupational therapy  *PT and Speech caps combine      $2100 Limit for PT and Speech combined  $2100 Limit for OT individually  At the beginning of the month where you expect to go over $2100, please add the [address] [place] modifier      Patient Name: Magaly Lester  YOB: 1950    Note:  This is an estimate of charges billed.      Date of [address] Name # units/ charge $$$ charge Daily Total Charge Ongoing Total $$$   5/1121 IE, libertad 1/1 82.82+23.22 106.04 106.04   5/18 libertad 3 29.72+23.22*2 76.16 182.20   5/21 PTA TE 3 25.26+19.74*2 64.74 246.94   5/24/21(PTA) 3TE 3 25.26+19.74*2 64.74 311.68   5/28/21 3TE 3 25.26+19.74*2 64.74 376.42   6/1/21 3TE 3 25.26+19.74*2 64.74 441.16

## 2021-06-02 ENCOUNTER — HOSPITAL ENCOUNTER (OUTPATIENT)
Dept: PHYSICAL THERAPY | Facility: CLINIC | Age: 71
Setting detail: THERAPIES SERIES
Discharge: HOME | End: 2021-06-02
Payer: MEDICARE

## 2021-06-02 PROCEDURE — 97110 THERAPEUTIC EXERCISES: CPT

## 2021-06-02 NOTE — FLOWSHEET NOTE
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 : --  Visit# / total visits:  Progress note for Medicare patient due at visit 10     Cancels/No Shows: 0/0    Subjective:    Pain:  [] Yes  [x] No Location:  N/A  Pain Rating: (0-10 scale) 0/10  Pain altered Tx:  [x] No  [] Yes  Action:  Comments: Pt reported no change in pain level. Stated that he has been completing his HEP. Objective:  Modalities: none  Precautions: R shoulder limited ROM and is bone on bone. Limited R hip ROM.   Arthritis in his back  Exercises:  Exercise Reps/ Time Weight/ Level Issued to HEP Completed Comments   Tasia 6'     x                   Supine             Bridges 2x10   5/18 x  cues for TA  To stabilize lumbar spine    SL hip abd 2x10   5/18 x     SAQ 2x15   x Soccer ball under knee            Prone        Hip Extension 2x10 ea   x Knee ext                         Gym             Leg Press (SL/DL) 2x10 ea   x 30# DL, 20# SL   Total Gym R squats/calf raises/heel raises 2x10 L16    Added heel and toe raises- DL   Total gym DL squats  2x10 L19      // bar ex          Static Marches 2x10    No UE support    Lunges 2x10          Fwd step ups 10x ea    Staircase, skip a step   Lateral step ups 15x ea 6\" 5/18 x     Posterior step ups 15x 6\" 5/18 x     Heel taps-lateral 15x 6\"   x     Tandem balance 2x20\" ea          SLS 2x30\" ea   x    4 way hip  15x ea blue   x bilateral   Rebounder toss w/ gait belt 10xea lil red ball  x Double leg fwd and B Modified tandem stance                     Other:     Specific Instructions for next treatment: add strength, and endurance to R LE      Treatment Charges: Mins Units   []  Modalities     [x]  Ther Exercise 49 3   []  Manual Therapy     []  Ther Activities     []  Aquatics     []  Vasocompression     []  Other     Total Treatment time 49 3   Airdyne 6' not billed for     Assessment: [x] Progressing toward goals. Pt with good tolerance to all activities, pt in need of intermitent unilateral UE use during balance activities. Pt noted some increase in pulling/pain during sidelying and prone activities. Increased resistance for 4 way hip with good tolerance. Progressed SL leg press resistance to increase LE strength. Improved reps for SAQ to progress LE strength. [] No change. [] Other:  [x] Patient would continue to benefit from skilled physical therapy services in order to:improve R LE strength and endurance       STG: (to be met in 4 treatments)  1. ? Strength:able to walk 1 block 3x/wk  2. ? Function: improve LEFS <20% interference  3. Patient to be independent with home exercise program as demonstrated by performance with correct form without cues.      LTG: (to be met in 8 treatments)  1. Able to walk 2 blocks without issues  2. LEFS <10% interference      Patient goals: \"strengthen hip\"    Pt. Education:  [x] Yes  [] No  [] Reviewed Prior HEP/Ed  Method of Education: [x] Verbal  [x] Demo rebounder  [] Written  Comprehension of Education:  [] Verbalizes understanding. [] Demonstrates understanding. [x] Needs review. [x] Demonstrates/verbalizes HEP/Ed previously given. Plan: [x] Continue current frequency toward long and short term goals.     [x] Specific Instructions for subsequent treatments:  Possible discharge after next visit, goal check      Time In: 1300      Time Out:  1355    Electronically signed by:  Shelly Farfan PTA

## 2021-06-02 NOTE — CARE COORDINATION
Medicare Cap   [x] Physical Therapy  [] Speech Therapy  [] Occupational therapy  *PT and Speech caps combine      $2100 Limit for PT and Speech combined  $2100 Limit for OT individually  At the beginning of the month where you expect to go over $2100, please add the [address] [place] [address] modifier      Patient Name: Gail Vargas  YOB: 1950    Note:  This is an estimate of charges billed.      Date of [address] Name # units/ charge $$$ charge Daily Total Charge Ongoing Total $$$   5/1121 IE, libertad 1/1 82.82+23.22 106.04 106.04   5/18 libertad 3 29.72+23.22*2 76.16 182.20   5/21 PTA TE 3 25.26+19.74*2 64.74 246.94   5/24/21(PTA) 3TE 3 25.26+19.74*2 64.74 311.68   5/28/21 3TE 3 25.26+19.74*2 64.74 376.42   6/1/21 3TE 3 25.26+19.74*2 64.74 441.16   6/2/21 TE 3  64.74 505.90

## 2021-06-03 ENCOUNTER — HOSPITAL ENCOUNTER (OUTPATIENT)
Dept: OCCUPATIONAL THERAPY | Facility: CLINIC | Age: 71
Setting detail: THERAPIES SERIES
Discharge: HOME | End: 2021-06-03
Payer: MEDICARE

## 2021-06-03 PROCEDURE — 97530 THERAPEUTIC ACTIVITIES: CPT

## 2021-06-03 NOTE — DISCHARGE SUMMARY
[] [facility] Out[facility]       Occupational Therapy 1st floor       [address], [place]         Phone: [phone]       Fax: [phone] [x] [address] Occupational Therapy  [address]  [place], [place]  Phone: [phone]  Fax: [phone]       Occupational [address] Extremity  Discharge Note    Date: 6/3/2021      Patient: Jessica Rocha  : 1950  MRN: [id number]  Referring Provider:  Dylan Thomson MD  Insurance: Medicare - BMN, calendar year, [facility] supplement - follow all medicare guidelines, dual insurance, no pt liability. Medical Diagnosis: Weakness of extremity, M62.81.  (right upper extremity)   Rehab Codes: Fine motor skills loss R29.818, muscle weakness generalized M62.81. Onset Date: 21    Next Dr. Alexi Landeros: 21  Cancels/No Shows:  0/0  Total visits attended: 4  Date of initial visit: 21               Date of final visit: 21    Subjective:   CC: Have trouble with positioning eating utensils, painting, legibility of writing, putting on left sock, slower doing things than normal.  HPI: (onset date: 21)  Ischemic stroke by pt report, affecting right side. Leg gets fatigued before the arm. Surgery Date: NA      Involved Extremity: Right   Dominant Extremity: Right    Pain: Intensity:  0 /10 Location: NA     Pain Type: NA  Pain Altered Tx: NA  Action Taken: NA    Objective: Tests/Measurements:   Upper Extremity Functional Index:  Current Functional Level:  80 points of a potential 80 points (increased 9 points), normal functional abiities as measured with the Upper Extremity Functional Index Survey. 0-80 scale, with 80 = no Deficits  (The UEFI model does not provide any specific cut off points that could classify the upper limb disability degree, however, a minimal detectable change of 9 points is provided. This means that for improvement or deterioration to be considered, between two subsequent evaluations, the scores must differ by at least 9 points.)    STRENGTH:    Pounds RIGHT LEFT    76.3  incr 2.3 73.3   Lateral pinch 24     incr 1 26   2 point pinch 17     incr 1 20   3 jaw pinch 22     incr 2 22     The affected  is 3.6 % stronger than the unaffected , a 7% improvement from the previous measurement. (affected score/unaffected score, take the total and subtract from 100)      Manual Muscle Test, Right Upper Extremity:   5-/5 shoulder flexion (unchanged), elbow flexion 5/5 (improved and WNL), 4/5 to 4+/5 all right finger ab- and adduction (improved). Remainder of right upper extremity 5/5 (WNL). COORDINATION  - Fine Motor (speed/dexterity):   Right in seconds Percentile Left in seconds Percentile   9 Hole Peg Test 22.72 seconds, 6.28 seconds faster Greater than the 50th percentile (improved) 21.44 seconds, 1.97 seconds faster  Greater than the 75th percentile (improved)       AROM:  DIGITS   Right       Extension/Flexion  Degrees INDEX MIDDLE RING LITTLE   MCP -5/WNL WNL/WNL +15/WNL WNL/WNL   PIP +5/WNL  -2/WNL -15/WNL     -3/WNL   DIP +5/WNL +5/WNL  +2/WNL  +10/WNL    Full fist Full fist Full Fist Full fist   Extensor lag noted in index and ring MCP joints, all PIP joints, and the DIP joints of the middle and ring fingers. AROM: Right elbow and forearm WNL. Right shoulder limited ROM and is bone on bone from unrelated condition    Sensibility: Normal    Edema: None      Skin Color: Normal (improved)  Skin/Scar condition Not tested    Problems: ROM, Strength, Function and Coordination     Assessment:  Patient would benefit from skilled occupational therapy services in order to: Improve and Increase  ROM, Strength, Activity tolerance and Coordination deficit in order to improve functional use of UE in ADL performance and return to PLOF. Short Term Goals: (  4    Treatments)  1. Increase AROM, Right:  a.  Index and ring MCP joint extension will be 0 degrees (WNL) - MET for ring finger. b. Index and middle finger PIP extension will be 0 degrees (WNL) - MET for index finger. c. Ring and little PIP extension will be -5 or less degrees - MET for little finger. d. Middle and ring DIP extension will be 0 degrees (WNL) - MET for both fingers. 2. Increase strength (pounds): Right  strength will be 77 or more pounds or symmetrical with the left  - Improved, Unmet (76.3 pounds). 3. Increase function: UE Functional Index Score will be 78 or more points to promote increased functional abilities - MET (80 of 80 points). 4. Patient to be independent with home exercise program as demonstrated by performance with correct form without cues - MET. Long Term Goals: NA    Patient Goals: Fine motor skills - hand. Treatment Potential: Good   Suggested Professional Referral: No  Domestic Concerns: No   Barriers to Goal Achievement: No    Comments/Assessment:  Pt is s/p CVA 04/05/21, approximately 8.5 weeks ago. Pt referred to occupational therapy services for  therapy for fine motor skills and writing. Right affected  strength is 3.6% stronger than the unaffected left  (WNL). Pinches are within 3 pound bilaterally (all improved1-2 pounds). Manual muscle test of the right upper extremity unremarkable except finger intrinsic muscles (digit ab- and adduction) which now test at 4 to 4+/5 (improved). Right hand fine motor coordination as measured with the 9 Hole Peg tested at greater than the 50th percentile, improved from  greater than the 10th percentile. The  left hand has also improved, but is still in the  greater than the 75th percentile. Plan to discharge pt at this time. Pt to continue home exercise program for approximatey 4-6 weeks.       Today's Treatment:  Modalities:                                 Precautions: R shoulder limited ROM and is bone on bone               Exercise Flow Sheet:  Exercise Reps/Time Weight/Level Comments   Fine motor/dexterity exercises     Home ex reviewed      Resistive /pinches with putty 10 reps each Sissy Decent  (coral/green combo) Home ex reviewed      Writing ex - with pen       Home ex reviewed. Recommended pt progress from pen  to large diameter pen, to regular diameter pen. Pt is agreeable.    Grooved pegboard  Timed ex 25 pegs   Not Completed    Marbles - radial and ulnar release 5 handfuls each    Home ex reviewed   Nuts and bolts assembly/  disassembly 5 reps   Home ex reviewed   O'Mac dexterity with multiple small pegs 30 reps   Not Completed   Resistive finger maze 1 series   Not Completed       Treatment Charges: Mins Units Time In/Out   [x]  Ther Exercise 0 0    []  Manual Therapy      [x]  Ther Activities      []  Orthotic fit/train      []  Orthotic recheck      [x]  There Activity 38 3 1059 - 1133   Total Treatment time 38 min             Medicare Cap   [] Physical Therapy  [] Speech Therapy  [x] Occupational therapy  *PT and Speech caps combine      $2100 Limit for PT and Speech combined  $2100 Limit for OT individually  At the beginning of the month where you expect to go over $2100, please add the [address] [place] modifier      Patient Name: Marybeth Mendoza  YOB: 1950    Note:  This is an estimate of charges billed.      Date of [facility] 63 Name # units/ charge $$$ charge Daily Total Charge Ongoing Total $$$   05/13/21 OT Eval - Low 1 87.69         Th Ex  4 23.22 x 4 180.57 180.57   05/20/21 Th Ex 3 29.72 +23.22 x 2    76.16 256.73   05/27/21 Th Ex  3 29.72 +23.22 x 2    76.16 332.89   06/03/21 Th Act 3  37.82+33.55+33.55 104.92 437.81                    Treatment Plan:  Therapeutic Ex 46608, Manual 51902 and Ultrasound 16814    Treatment to Date:  [x] Therapeutic Exercise    [] Modalities:  [] Therapeutic Activity     [] Ultrasound  [] Electrical Stimulation  [] Ortho refit/check             [] Massage   [] Fluidotherapy  [] Neuromuscular Re-education [] Cold/hotpack [] Iontophoresis: 4 mg/mL  [x] Instruction in Home Exercise Program Dexamethasone Sodium  [] Manual Therapy             Phosphate 40-80 mAmin          [] Paraffin        [] Other:    Discharge Status:     [] Pt recovered from conditions. Treatment goals were met. [] Pt received maximum benefit. No further therapy indicated at this time. [x] Pt to continue exercise/home instructions independently. [] Therapy interrupted due to:    [] Pt has 2 or more no shows/cancels, is discontinued per our policy. [x] Pt has completed prescribed number of treatment sessions. [] Other:     Time In/Time Out: 8388 - 7599       Electronically signed by CAROLINE Landon/L, CHT on 6/3/2021 at 11:00 AM    If you have any questions or concerns, please don't hesitate to call.   Thank you for your referral.

## 2021-06-08 ENCOUNTER — HOSPITAL ENCOUNTER (OUTPATIENT)
Dept: PHYSICAL THERAPY | Facility: CLINIC | Age: 71
Setting detail: THERAPIES SERIES
Discharge: HOME | End: 2021-06-08
Payer: MEDICARE

## 2021-06-08 PROCEDURE — 97110 THERAPEUTIC EXERCISES: CPT

## 2021-06-08 NOTE — CARE COORDINATION
Medicare Cap   [x] Physical Therapy  [] Speech Therapy  [x] Occupational therapy  *PT and Speech caps combine      $2100 Limit for PT and Speech combined  $2100 Limit for OT individually  At the beginning of the month where you expect to go over $2100, please add the [address] [place] modifier      Patient Name: Darnell Barr: 1950    Note:  This is an estimate of charges billed.      Date of [address] Name # units/ charge $$$ charge Daily Total Charge Ongoing Total $$$   5/1121 IE, libertad 1/1 82.82+23.22 106.04 106.04   5/18 libertad 3 29.72+23.22*2 76.16 182.20   5/21 PTA TE 3 25.26+19.74*2 64.74 246.94   5/24/21(PTA) 3TE 3 25.26+19.74*2 64.74 311.68   5/28/21 3TE 3 25.26+19.74*2 64.74 376.42   6/1/21 3TE 3 25.26+19.74*2 64.74 441.16   6/2/21 TE 3  64.74 505.90   6/8/21 TE+ final OT 3  76.16 582.06/1019.81

## 2021-06-08 NOTE — DISCHARGE SUMMARY
[x] [facility] MAYNOR &  Therapy  [address]  P: [phone]  F: [phone] [] [address]  [facility]: [phone]  F: [phone]      Physical Therapy Daily Treatment/Discharge Note    Date:  2021  Patient Name:  Marybeth Mendoza    :  1950  MRN: [id number]  Physician: Dr Jesi Gaytan: Medicare  Medical Diagnosis: CVA w/ R LE an UE weakness                        Rehab Codes: R29.898  Onset date: 21                                       Next 's appt. : --  Visit# / total visits:    Cancels/No Shows: 0/0    Subjective:    Pain:  [] Yes  [x] No Location:  N/A  Pain Rating: (0-10 scale) 0/10  Pain altered Tx:  [x] No  [] Yes  Action:  Comments: pt reports that he is \"somewhere about 95%\" back to normal.  Compliant with HEP     Objective:  Modalities: none  Precautions: R shoulder limited ROM and is bone on bone. Limited R hip ROM.   Arthritis in his back  Exercises:  Exercise Reps/ Time Weight/ Level Issued to HEP Completed Comments   Tasia 6'     x                   Supine             Bridges 2x10   5/18 X  cues for TA  To stabilize lumbar spine    SL hip abd 2x10   5/18      SAQ 2x15    Soccer ball under knee            Prone        Hip Extension 2x10 ea   X Knee ext   Quad stretch 3x30\"   X                  Gym             Leg Press (SL/DL) 2x10 ea    30# DL, 20# SL   Total Gym R squats/calf raises/heel raises 2x10 L16    Added heel and toe raises- DL   Total gym DL squats  2x10 L19      // bar ex          Static Marches 2x10    No UE support    Lunges 2x10          Fwd step ups 10x ea    Staircase, skip a step   Lateral step ups 20x 6\" 5/18 x     Posterior step ups 20x 6\" 5/18 x     Heel taps-lateral 20x 6\"        Tandem balance 2x20\" ea          SLS 2x30\" ea       4 way hip  15x ea blue  x x bilateral   Rebounder toss w/ gait belt 10xea lil red ball  -- Double leg fwd and B Modified tandem stance                     Other:         Treatment Charges: Mins Units   []  Modalities     [x]  Ther Exercise 42 3   []  Manual Therapy     []  Ther Activities     []  Aquatics     []  Vasocompression     []  Other     Total Treatment time 42 3       Assessment: [x] Progressing toward goals. Pt states he is pleased with his outcome and feels that he has accomplished the goal of getting the R leg stronger and more comparable to the L. He rates himself at 95% back to normal.  LEFS score is 89%, 11% interference. [] No change. [] Other:  [x] Patient would continue to benefit from skilled physical therapy services in order to:improve R LE strength and endurance       STG: (to be met in 4 treatments)  1. ? Strength:able to walk 1 block 3x/wk He can walk 1 block 3-4x/wk  2. ? Function: improve LEFS <20% interference  3. Patient to be independent with home exercise program as demonstrated by performance with correct form without cues.      LTG: (to be met in 8 treatments)  1. Able to walk 2 blocks without issues  2. LEFS <10% interference      Patient goals: \"strengthen hip\"    Pt. Education:  [x] Yes  [] No  [] Reviewed Prior HEP/Ed  Method of Education: [x] Verbal  [x] Demo rebounder  [] Written  Comprehension of Education:  [] Verbalizes understanding. [] Demonstrates understanding. [x] Needs review. [x] Demonstrates/verbalizes HEP/Ed previously given. Plan: [] Continue current frequency toward long and short term goals.     [x] DC to HEP       Time In: 7775  Time Out:  8385  Electronically signed by:  Dylan Mejia, PT

## 2021-06-10 ENCOUNTER — APPOINTMENT (OUTPATIENT)
Dept: OCCUPATIONAL THERAPY | Facility: CLINIC | Age: 71
End: 2021-06-10
Payer: MEDICARE

## 2021-06-11 ENCOUNTER — APPOINTMENT (OUTPATIENT)
Dept: PHYSICAL THERAPY | Facility: CLINIC | Age: 71
End: 2021-06-11
Payer: MEDICARE

## 2021-08-18 ENCOUNTER — HOSPITAL ENCOUNTER (OUTPATIENT)
Age: 71
Setting detail: SPECIMEN
Discharge: HOME | End: 2021-08-18
Payer: MEDICARE

## 2021-08-18 LAB
ANION GAP SERPL CALCULATED.3IONS-SCNC: 21 MMOL/L (ref 9–17)
BUN BLDV-MCNC: 16 MG/DL (ref 8–23)
BUN/CREAT BLD: ABNORMAL (ref 9–20)
CALCIUM SERPL-MCNC: 9.3 MG/DL (ref 8.6–10.4)
CHLORIDE BLD-SCNC: 104 MMOL/L (ref 98–107)
CO2: 18 MMOL/L (ref 20–31)
CREAT SERPL-MCNC: 1.09 MG/DL (ref 0.7–1.2)
ESTIMATED AVERAGE GLUCOSE: 157 MG/DL
GFR AFRICAN AMERICAN: >60 ML/MIN
GFR NON-AFRICAN AMERICAN: >60 ML/MIN
GFR SERPL CREATININE-BSD FRML MDRD: ABNORMAL ML/MIN/{1.73_M2}
GFR SERPL CREATININE-BSD FRML MDRD: ABNORMAL ML/MIN/{1.73_M2}
GLUCOSE BLD-MCNC: 119 MG/DL (ref 70–99)
HBA1C MFR BLD: 7.1 % (ref 4–6)
POTASSIUM SERPL-SCNC: 4.3 MMOL/L (ref 3.7–5.3)
SODIUM BLD-SCNC: 143 MMOL/L (ref 135–144)

## 2021-11-23 ENCOUNTER — HOSPITAL ENCOUNTER (OUTPATIENT)
Age: 71
Setting detail: SPECIMEN
Discharge: HOME | End: 2021-11-23

## 2021-11-23 LAB
ABSOLUTE EOS #: 0.16 K/UL (ref 0–0.44)
ABSOLUTE IMMATURE GRANULOCYTE: <0.03 K/UL (ref 0–0.3)
ABSOLUTE LYMPH #: 2.61 K/UL (ref 1.1–3.7)
ABSOLUTE MONO #: 0.75 K/UL (ref 0.1–1.2)
ALBUMIN SERPL-MCNC: 4.3 G/DL (ref 3.5–5.2)
ALBUMIN/GLOBULIN RATIO: 1.4 (ref 1–2.5)
ALP BLD-CCNC: 67 U/L (ref 40–129)
ALT SERPL-CCNC: 17 U/L (ref 5–41)
ANION GAP SERPL CALCULATED.3IONS-SCNC: 16 MMOL/L (ref 9–17)
AST SERPL-CCNC: 19 U/L
BASOPHILS # BLD: 1 % (ref 0–2)
BASOPHILS ABSOLUTE: 0.08 K/UL (ref 0–0.2)
BILIRUB SERPL-MCNC: 0.49 MG/DL (ref 0.3–1.2)
BUN BLDV-MCNC: 18 MG/DL (ref 8–23)
BUN/CREAT BLD: ABNORMAL (ref 9–20)
CALCIUM SERPL-MCNC: 9.5 MG/DL (ref 8.6–10.4)
CHLORIDE BLD-SCNC: 103 MMOL/L (ref 98–107)
CHOLESTEROL, FASTING: 134 MG/DL
CHOLESTEROL/HDL RATIO: 3.6
CO2: 20 MMOL/L (ref 20–31)
CREAT SERPL-MCNC: 1.03 MG/DL (ref 0.7–1.2)
DIFFERENTIAL TYPE: NORMAL
EOSINOPHILS RELATIVE PERCENT: 2 % (ref 1–4)
ESTIMATED AVERAGE GLUCOSE: 157 MG/DL
GFR AFRICAN AMERICAN: >60 ML/MIN
GFR NON-AFRICAN AMERICAN: >60 ML/MIN
GFR SERPL CREATININE-BSD FRML MDRD: ABNORMAL ML/MIN/{1.73_M2}
GFR SERPL CREATININE-BSD FRML MDRD: ABNORMAL ML/MIN/{1.73_M2}
GLUCOSE BLD-MCNC: 112 MG/DL (ref 70–99)
HBA1C MFR BLD: 7.1 % (ref 4–6)
HCT VFR BLD CALC: 49.4 % (ref 40.7–50.3)
HDLC SERPL-MCNC: 37 MG/DL
HEMOGLOBIN: 15.8 G/DL (ref 13–17)
IMMATURE GRANULOCYTES: 0 %
LDL CHOLESTEROL: 78 MG/DL (ref 0–130)
LYMPHOCYTES # BLD: 38 % (ref 24–43)
MCH RBC QN AUTO: 29 PG (ref 25.2–33.5)
MCHC RBC AUTO-ENTMCNC: 32 G/DL (ref 28.4–34.8)
MCV RBC AUTO: 90.8 FL (ref 82.6–102.9)
MONOCYTES # BLD: 11 % (ref 3–12)
NRBC AUTOMATED: 0 PER 100 WBC
PDW BLD-RTO: 13.4 % (ref 11.8–14.4)
PLATELET # BLD: 268 K/UL (ref 138–453)
PLATELET ESTIMATE: NORMAL
PMV BLD AUTO: 10.2 FL (ref 8.1–13.5)
POTASSIUM SERPL-SCNC: 3.8 MMOL/L (ref 3.7–5.3)
PROSTATE SPECIFIC ANTIGEN: 2.22 UG/L
RBC # BLD: 5.44 M/UL (ref 4.21–5.77)
RBC # BLD: NORMAL 10*6/UL
SEG NEUTROPHILS: 48 % (ref 36–65)
SEGMENTED NEUTROPHILS ABSOLUTE COUNT: 3.23 K/UL (ref 1.5–8.1)
SODIUM BLD-SCNC: 139 MMOL/L (ref 135–144)
TOTAL PROTEIN: 7.3 G/DL (ref 6.4–8.3)
TRIGLYCERIDE, FASTING: 93 MG/DL
VLDLC SERPL CALC-MCNC: ABNORMAL MG/DL (ref 1–30)
WBC # BLD: 6.9 K/UL (ref 3.5–11.3)
WBC # BLD: NORMAL 10*3/UL

## 2022-02-22 ENCOUNTER — HOSPITAL ENCOUNTER (OUTPATIENT)
Age: 72
Setting detail: SPECIMEN
Discharge: HOME | End: 2022-02-22

## 2022-02-22 LAB
ANION GAP SERPL CALCULATED.3IONS-SCNC: 17 MMOL/L (ref 9–17)
BUN BLDV-MCNC: 20 MG/DL (ref 8–23)
CALCIUM SERPL-MCNC: 9.5 MG/DL (ref 8.6–10.4)
CHLORIDE BLD-SCNC: 103 MMOL/L (ref 98–107)
CO2: 23 MMOL/L (ref 20–31)
CREAT SERPL-MCNC: 0.99 MG/DL (ref 0.7–1.2)
ESTIMATED AVERAGE GLUCOSE: 146 MG/DL
GFR AFRICAN AMERICAN: >60 ML/MIN
GFR NON-AFRICAN AMERICAN: >60 ML/MIN
GFR SERPL CREATININE-BSD FRML MDRD: ABNORMAL ML/MIN/{1.73_M2}
GLUCOSE BLD-MCNC: 125 MG/DL (ref 70–99)
HBA1C MFR BLD: 6.7 % (ref 4–6)
POTASSIUM SERPL-SCNC: 4.3 MMOL/L (ref 3.7–5.3)
SODIUM BLD-SCNC: 143 MMOL/L (ref 135–144)

## 2022-05-26 ENCOUNTER — HOSPITAL ENCOUNTER (OUTPATIENT)
Age: 72
Setting detail: SPECIMEN
Discharge: HOME | End: 2022-05-26

## 2022-05-26 LAB
ANION GAP SERPL CALCULATED.3IONS-SCNC: 15 MMOL/L (ref 9–17)
BUN BLDV-MCNC: 18 MG/DL (ref 8–23)
CALCIUM SERPL-MCNC: 9.4 MG/DL (ref 8.6–10.4)
CHLORIDE BLD-SCNC: 104 MMOL/L (ref 98–107)
CO2: 21 MMOL/L (ref 20–31)
CREAT SERPL-MCNC: 1.03 MG/DL (ref 0.7–1.2)
ESTIMATED AVERAGE GLUCOSE: 140 MG/DL
GFR AFRICAN AMERICAN: >60 ML/MIN
GFR NON-AFRICAN AMERICAN: >60 ML/MIN
GFR SERPL CREATININE-BSD FRML MDRD: ABNORMAL ML/MIN/{1.73_M2}
GLUCOSE BLD-MCNC: 116 MG/DL (ref 70–99)
HBA1C MFR BLD: 6.5 % (ref 4–6)
POTASSIUM SERPL-SCNC: 4.3 MMOL/L (ref 3.7–5.3)
SODIUM BLD-SCNC: 140 MMOL/L (ref 135–144)

## 2022-10-26 ENCOUNTER — HOSPITAL ENCOUNTER (OUTPATIENT)
Age: 72
Setting detail: SPECIMEN
Discharge: HOME | End: 2022-10-26

## 2022-10-26 LAB
ABSOLUTE EOS #: 0.18 K/UL (ref 0–0.44)
ABSOLUTE IMMATURE GRANULOCYTE: 0.03 K/UL (ref 0–0.3)
ABSOLUTE LYMPH #: 2.91 K/UL (ref 1.1–3.7)
ABSOLUTE MONO #: 0.96 K/UL (ref 0.1–1.2)
ALBUMIN SERPL-MCNC: 4 G/DL (ref 3.5–5.2)
ALBUMIN/GLOBULIN RATIO: 1.3 (ref 1–2.5)
ALP BLD-CCNC: 61 U/L (ref 40–129)
ALT SERPL-CCNC: 16 U/L (ref 5–41)
ANION GAP SERPL CALCULATED.3IONS-SCNC: 15 MMOL/L (ref 9–17)
AST SERPL-CCNC: 17 U/L
BASOPHILS # BLD: 1 % (ref 0–2)
BASOPHILS ABSOLUTE: 0.1 K/UL (ref 0–0.2)
BILIRUB SERPL-MCNC: 0.4 MG/DL (ref 0.3–1.2)
BUN BLDV-MCNC: 24 MG/DL (ref 8–23)
CALCIUM SERPL-MCNC: 9.5 MG/DL (ref 8.6–10.4)
CHLORIDE BLD-SCNC: 104 MMOL/L (ref 98–107)
CHOLESTEROL/HDL RATIO: 3.6
CHOLESTEROL: 133 MG/DL
CO2: 21 MMOL/L (ref 20–31)
CREAT SERPL-MCNC: 1.27 MG/DL (ref 0.7–1.2)
EOSINOPHILS RELATIVE PERCENT: 2 % (ref 1–4)
ESTIMATED AVERAGE GLUCOSE: 143 MG/DL
GFR SERPL CREATININE-BSD FRML MDRD: >60 ML/MIN/1.73M2
GLUCOSE BLD-MCNC: 106 MG/DL (ref 70–99)
HBA1C MFR BLD: 6.6 % (ref 4–6)
HCT VFR BLD CALC: 48.3 % (ref 40.7–50.3)
HDLC SERPL-MCNC: 37 MG/DL
HEMOGLOBIN: 15.4 G/DL (ref 13–17)
IMMATURE GRANULOCYTES: 0 %
LDL CHOLESTEROL: 74 MG/DL (ref 0–130)
LYMPHOCYTES # BLD: 36 % (ref 24–43)
MCH RBC QN AUTO: 29.2 PG (ref 25.2–33.5)
MCHC RBC AUTO-ENTMCNC: 31.9 G/DL (ref 28.4–34.8)
MCV RBC AUTO: 91.5 FL (ref 82.6–102.9)
MONOCYTES # BLD: 12 % (ref 3–12)
NRBC AUTOMATED: 0 PER 100 WBC
PDW BLD-RTO: 13.6 % (ref 11.8–14.4)
PLATELET # BLD: 265 K/UL (ref 138–453)
PMV BLD AUTO: 10.4 FL (ref 8.1–13.5)
POTASSIUM SERPL-SCNC: 4.5 MMOL/L (ref 3.7–5.3)
RBC # BLD: 5.28 M/UL (ref 4.21–5.77)
SEG NEUTROPHILS: 49 % (ref 36–65)
SEGMENTED NEUTROPHILS ABSOLUTE COUNT: 3.9 K/UL (ref 1.5–8.1)
SODIUM BLD-SCNC: 140 MMOL/L (ref 135–144)
TOTAL PROTEIN: 7.2 G/DL (ref 6.4–8.3)
TRIGL SERPL-MCNC: 112 MG/DL
WBC # BLD: 8.1 K/UL (ref 3.5–11.3)

## 2023-05-31 ENCOUNTER — HOSPITAL ENCOUNTER (OUTPATIENT)
Age: 73
Setting detail: SPECIMEN
Discharge: HOME | End: 2023-05-31

## 2023-05-31 LAB
ANION GAP SERPL CALCULATED.3IONS-SCNC: 14 MMOL/L (ref 9–17)
BUN SERPL-MCNC: 16 MG/DL (ref 8–23)
CALCIUM SERPL-MCNC: 9.5 MG/DL (ref 8.6–10.4)
CHLORIDE SERPL-SCNC: 104 MMOL/L (ref 98–107)
CO2 SERPL-SCNC: 19 MMOL/L (ref 20–31)
CREAT SERPL-MCNC: 1.11 MG/DL (ref 0.7–1.2)
EST. AVERAGE GLUCOSE BLD GHB EST-MCNC: 163 MG/DL
GFR SERPL CREATININE-BSD FRML MDRD: >60 ML/MIN/1.73M2
GLUCOSE SERPL-MCNC: 96 MG/DL (ref 70–99)
HBA1C MFR BLD: 7.3 % (ref 4–6)
POTASSIUM SERPL-SCNC: 3.9 MMOL/L (ref 3.7–5.3)
SODIUM SERPL-SCNC: 137 MMOL/L (ref 135–144)

## 2023-09-08 ENCOUNTER — HOSPITAL ENCOUNTER (OUTPATIENT)
Age: 73
Setting detail: SPECIMEN
Discharge: HOME | End: 2023-09-08

## 2023-09-08 LAB
EST. AVERAGE GLUCOSE BLD GHB EST-MCNC: 169 MG/DL
GLUCOSE SERPL-MCNC: 165 MG/DL (ref 70–99)
HBA1C MFR BLD: 7.5 % (ref 4–6)

## 2023-12-12 ENCOUNTER — HOSPITAL ENCOUNTER (OUTPATIENT)
Age: 73
Setting detail: SPECIMEN
Discharge: HOME | End: 2023-12-12

## 2023-12-12 LAB
ALBUMIN SERPL-MCNC: 4 G/DL (ref 3.5–5.2)
ALBUMIN/GLOB SERPL: 1 {RATIO} (ref 1–2.5)
ALP SERPL-CCNC: 60 U/L (ref 40–129)
ALT SERPL-CCNC: 17 U/L (ref 10–50)
ANION GAP SERPL CALCULATED.3IONS-SCNC: 11 MMOL/L (ref 9–16)
AST SERPL-CCNC: 23 U/L (ref 10–50)
BASOPHILS # BLD: 0.11 K/UL (ref 0–0.2)
BASOPHILS NFR BLD: 1 % (ref 0–2)
BILIRUB SERPL-MCNC: 0.5 MG/DL (ref 0–1.2)
BUN SERPL-MCNC: 16 MG/DL (ref 8–23)
CALCIUM SERPL-MCNC: 9.2 MG/DL (ref 8.6–10.4)
CHLORIDE SERPL-SCNC: 103 MMOL/L (ref 98–107)
CHOLEST SERPL-MCNC: 123 MG/DL (ref 0–199)
CHOLESTEROL/HDL RATIO: 3
CO2 SERPL-SCNC: 25 MMOL/L (ref 20–31)
CREAT SERPL-MCNC: 1.2 MG/DL (ref 0.7–1.2)
EOSINOPHIL # BLD: 0.16 K/UL (ref 0–0.44)
EOSINOPHILS RELATIVE PERCENT: 2 % (ref 1–4)
ERYTHROCYTE [DISTWIDTH] IN BLOOD BY AUTOMATED COUNT: 13.4 % (ref 11.8–14.4)
EST. AVERAGE GLUCOSE BLD GHB EST-MCNC: 157 MG/DL
GFR SERPL CREATININE-BSD FRML MDRD: >60 ML/MIN/1.73M2
GLUCOSE SERPL-MCNC: 110 MG/DL (ref 74–99)
HBA1C MFR BLD: 7.1 % (ref 4–6)
HCT VFR BLD AUTO: 46.2 % (ref 40.7–50.3)
HDLC SERPL-MCNC: 36 MG/DL
HGB BLD-MCNC: 15.2 G/DL (ref 13–17)
IMM GRANULOCYTES # BLD AUTO: 0.03 K/UL (ref 0–0.3)
IMM GRANULOCYTES NFR BLD: 0 %
LDLC SERPL CALC-MCNC: 72 MG/DL (ref 0–100)
LYMPHOCYTES NFR BLD: 2.82 K/UL (ref 1.1–3.7)
LYMPHOCYTES RELATIVE PERCENT: 36 % (ref 24–43)
MCH RBC QN AUTO: 29.2 PG (ref 25.2–33.5)
MCHC RBC AUTO-ENTMCNC: 32.9 G/DL (ref 28.4–34.8)
MCV RBC AUTO: 88.7 FL (ref 82.6–102.9)
MONOCYTES NFR BLD: 0.91 K/UL (ref 0.1–1.2)
MONOCYTES NFR BLD: 12 % (ref 3–12)
NEUTROPHILS NFR BLD: 49 % (ref 36–65)
NEUTS SEG NFR BLD: 3.88 K/UL (ref 1.5–8.1)
NRBC BLD-RTO: 0 PER 100 WBC
PLATELET # BLD AUTO: 241 K/UL (ref 138–453)
PMV BLD AUTO: 10.1 FL (ref 8.1–13.5)
POTASSIUM SERPL-SCNC: 4.4 MMOL/L (ref 3.7–5.3)
PROT SERPL-MCNC: 7.2 G/DL (ref 6.6–8.7)
RBC # BLD AUTO: 5.21 M/UL (ref 4.21–5.77)
SODIUM SERPL-SCNC: 139 MMOL/L (ref 136–145)
TRIGL SERPL-MCNC: 74 MG/DL (ref 0–149)
VLDLC SERPL CALC-MCNC: 15 MG/DL
WBC OTHER # BLD: 7.9 K/UL (ref 3.5–11.3)

## 2024-03-20 ENCOUNTER — HOSPITAL ENCOUNTER (OUTPATIENT)
Age: 74
Setting detail: SPECIMEN
Discharge: HOME | End: 2024-03-20

## 2024-03-20 LAB
ANION GAP SERPL CALCULATED.3IONS-SCNC: 10 MMOL/L (ref 9–16)
BUN SERPL-MCNC: 19 MG/DL (ref 8–23)
CALCIUM SERPL-MCNC: 10.4 MG/DL (ref 8.6–10.4)
CHLORIDE SERPL-SCNC: 104 MMOL/L (ref 98–107)
CO2 SERPL-SCNC: 27 MMOL/L (ref 20–31)
CREAT SERPL-MCNC: 1.2 MG/DL (ref 0.7–1.2)
EST. AVERAGE GLUCOSE BLD GHB EST-MCNC: 160 MG/DL
GFR SERPL CREATININE-BSD FRML MDRD: >60 ML/MIN/1.73M2
GLUCOSE SERPL-MCNC: 131 MG/DL (ref 74–99)
HBA1C MFR BLD: 7.2 % (ref 4–6)
POTASSIUM SERPL-SCNC: 4.6 MMOL/L (ref 3.7–5.3)
SODIUM SERPL-SCNC: 141 MMOL/L (ref 136–145)

## 2024-06-26 ENCOUNTER — HOSPITAL ENCOUNTER (OUTPATIENT)
Age: 74
Setting detail: SPECIMEN
Discharge: HOME | End: 2024-06-26

## 2024-06-26 LAB
ANION GAP SERPL CALCULATED.3IONS-SCNC: 13 MMOL/L (ref 9–16)
BUN SERPL-MCNC: 20 MG/DL (ref 8–23)
CALCIUM SERPL-MCNC: 9.2 MG/DL (ref 8.6–10.4)
CHLORIDE SERPL-SCNC: 106 MMOL/L (ref 98–107)
CO2 SERPL-SCNC: 22 MMOL/L (ref 20–31)
CREAT SERPL-MCNC: 1.3 MG/DL (ref 0.7–1.2)
EST. AVERAGE GLUCOSE BLD GHB EST-MCNC: 171 MG/DL
GFR, ESTIMATED: 59 ML/MIN/1.73M2
GLUCOSE SERPL-MCNC: 142 MG/DL (ref 74–99)
HBA1C MFR BLD: 7.6 % (ref 4–6)
POTASSIUM SERPL-SCNC: 4.3 MMOL/L (ref 3.7–5.3)
SODIUM SERPL-SCNC: 141 MMOL/L (ref 136–145)

## 2024-10-01 ENCOUNTER — HOSPITAL ENCOUNTER (OUTPATIENT)
Age: 74
Setting detail: SPECIMEN
Discharge: HOME | End: 2024-10-01

## 2024-10-01 LAB
ANION GAP SERPL CALCULATED.3IONS-SCNC: 11 MMOL/L (ref 9–16)
BUN SERPL-MCNC: 22 MG/DL (ref 8–23)
CALCIUM SERPL-MCNC: 9.2 MG/DL (ref 8.6–10.4)
CHLORIDE SERPL-SCNC: 103 MMOL/L (ref 98–107)
CO2 SERPL-SCNC: 24 MMOL/L (ref 20–31)
CREAT SERPL-MCNC: 1.3 MG/DL (ref 0.7–1.2)
EST. AVERAGE GLUCOSE BLD GHB EST-MCNC: 171 MG/DL
GFR, ESTIMATED: 60 ML/MIN/1.73M2
GLUCOSE SERPL-MCNC: 130 MG/DL (ref 74–99)
HBA1C MFR BLD: 7.6 % (ref 4–6)
POTASSIUM SERPL-SCNC: 4.5 MMOL/L (ref 3.7–5.3)
SODIUM SERPL-SCNC: 138 MMOL/L (ref 136–145)

## 2025-01-13 ENCOUNTER — HOSPITAL ENCOUNTER (OUTPATIENT)
Age: 75
Setting detail: SPECIMEN
Discharge: HOME | End: 2025-01-13

## 2025-01-13 LAB
ALBUMIN SERPL-MCNC: 4.3 G/DL (ref 3.5–5.2)
ALBUMIN/GLOB SERPL: 1.6 {RATIO} (ref 1–2.5)
ALP SERPL-CCNC: 50 U/L (ref 40–129)
ALT SERPL-CCNC: 15 U/L (ref 10–50)
ANION GAP SERPL CALCULATED.3IONS-SCNC: 12 MMOL/L (ref 9–16)
AST SERPL-CCNC: 20 U/L (ref 10–50)
BASOPHILS # BLD: 0.07 K/UL (ref 0–0.2)
BASOPHILS NFR BLD: 1 % (ref 0–2)
BILIRUB SERPL-MCNC: 0.5 MG/DL (ref 0–1.2)
BUN SERPL-MCNC: 19 MG/DL (ref 8–23)
CALCIUM SERPL-MCNC: 9.6 MG/DL (ref 8.6–10.4)
CHLORIDE SERPL-SCNC: 104 MMOL/L (ref 98–107)
CHOLEST SERPL-MCNC: 134 MG/DL (ref 0–199)
CHOLESTEROL/HDL RATIO: 3.4
CO2 SERPL-SCNC: 23 MMOL/L (ref 20–31)
CREAT SERPL-MCNC: 1.2 MG/DL (ref 0.7–1.2)
EOSINOPHIL # BLD: 0.14 K/UL (ref 0–0.44)
EOSINOPHILS RELATIVE PERCENT: 2 % (ref 1–4)
ERYTHROCYTE [DISTWIDTH] IN BLOOD BY AUTOMATED COUNT: 13.2 % (ref 11.8–14.4)
EST. AVERAGE GLUCOSE BLD GHB EST-MCNC: 140 MG/DL
GFR, ESTIMATED: 64 ML/MIN/1.73M2
GLUCOSE SERPL-MCNC: 115 MG/DL (ref 74–99)
HBA1C MFR BLD: 6.5 % (ref 4–6)
HCT VFR BLD AUTO: 43.3 % (ref 40.7–50.3)
HDLC SERPL-MCNC: 40 MG/DL
HGB BLD-MCNC: 14.1 G/DL (ref 13–17)
IMM GRANULOCYTES # BLD AUTO: 0.03 K/UL (ref 0–0.3)
IMM GRANULOCYTES NFR BLD: 0 %
LDLC SERPL CALC-MCNC: 76 MG/DL (ref 0–100)
LYMPHOCYTES NFR BLD: 2.07 K/UL (ref 1.1–3.7)
LYMPHOCYTES RELATIVE PERCENT: 30 % (ref 24–43)
MCH RBC QN AUTO: 29.2 PG (ref 25.2–33.5)
MCHC RBC AUTO-ENTMCNC: 32.6 G/DL (ref 28.4–34.8)
MCV RBC AUTO: 89.6 FL (ref 82.6–102.9)
MONOCYTES NFR BLD: 0.78 K/UL (ref 0.1–1.2)
MONOCYTES NFR BLD: 11 % (ref 3–12)
NEUTROPHILS NFR BLD: 56 % (ref 36–65)
NEUTS SEG NFR BLD: 3.84 K/UL (ref 1.5–8.1)
NRBC BLD-RTO: 0 PER 100 WBC
PLATELET # BLD AUTO: 220 K/UL (ref 138–453)
PMV BLD AUTO: 10.3 FL (ref 8.1–13.5)
POTASSIUM SERPL-SCNC: 4.1 MMOL/L (ref 3.7–5.3)
PROT SERPL-MCNC: 7 G/DL (ref 6.6–8.7)
RBC # BLD AUTO: 4.83 M/UL (ref 4.21–5.77)
SODIUM SERPL-SCNC: 139 MMOL/L (ref 136–145)
TRIGL SERPL-MCNC: 91 MG/DL
VLDLC SERPL CALC-MCNC: 18 MG/DL (ref 1–30)
WBC OTHER # BLD: 6.9 K/UL (ref 3.5–11.3)

## 2025-03-22 NOTE — FLOWSHEET NOTE
[x] [facility] MAYNOR &  Therapy  [address]  P: [phone]  F: [phone] [] [address]  [facility]: [phone]  F: [phone]      Physical Therapy Daily Treatment Note    Date:  2021  Patient Name:  Conor Cowart    :  1950  MRN: [id number]  Physician: Dr Hernán Jimenes: Medicare  Medical Diagnosis: CVA w/ R LE an UE weakness                        Rehab Codes: R29.898  Onset date: 21                                       Next 's appt. : --  Visit# / total visits:  Progress note for Medicare patient due at visit 10     Cancels/No Shows: 0/0    Subjective:    Pain:  [] Yes  [x] No Location:  N/A Pain Rating: (0-10 scale) 0/10  Pain altered Tx:  [x] No  [] Yes  Action:  Comments: Pt arrives w/ nothing new to report upon arrival, feeling good and states he is getting stronger. Objective:  Modalities: none  Precautions: R shoulder limited ROM and is bone on bone. Limited R hip ROM.   Arthritis in his back  Exercises:  Exercise Reps/ Time Weight/ Level Issued to HEP Completed Comments   Tasia 6'     x                   Supine             Bridges 2x10   5/18   cues for TA  To stabilize lumbar spine    SL hip abd 2x10   5/18              Prone        Hip Extension 2x10 ea   x Knee ext   SAQ 2x10   x Soccer ball at ankle                 Gym             Leg Press (SL/DL) 2x10 ea   x 30# DL, 10# SL   Total Gym R squats/calf raises/heel raises 2x10 L16    Added heel and toe raises- DL   Total gym DL squats  2x10 L19      // bar ex          Static Marches 2x10   x No UE support    Lunges 2x10          Fwd step ups 10x ea   x Staircase, skip a step   Lateral step ups 15x 6\" 5/18      Posterior step ups 15x 6\" 5/18      Heel taps 15x 6\"   x     Tandem balance 2x20\" ea          SLS 2x20\" ea       4 way hip  15x ea lime   x bilateral   Other:     Specific Instructions for next treatment: add ex for balance, strength, and endurance to R LE      Treatment Charges: Mins Units   []  Modalities     [x]  Ther Exercise 45 3   []  Manual Therapy     []  Ther Activities     []  Aquatics     []  Vasocompression     []  Other     Total Treatment time 45 3       Assessment: [x] Progressing toward goals. Progressed 4 way hip's resistance, height of heel taps, and introduced leg press instead of TG this visit. Focused on R quad and R hip strengthening w/ pt reporting muscle fatigue post.     [] No change. [] Other:  [] Patient would continue to benefit from skilled physical therapy services in order to:     STG/LTG  STG: (to be met in 4 treatments)  1. ? Strength:able to walk 1 block 3x/wk  2. ? Function: improve LEFS <20% interference  3. Patient to be independent with home exercise program as demonstrated by performance with correct form without cues.     LTG: (to be met in 8 treatments)  1. Able to walk 2 blocks without issues  2. LEFS <10% interference      Patient goals: \"strengthen hip\"    Pt. Education:  [x] Yes  [] No  [] Reviewed Prior HEP/Ed  Method of Education: [] Verbal  [x] Demo  [x] Written  Comprehension of Education:  [] Verbalizes understanding. [] Demonstrates understanding. [x] Needs review. [] Demonstrates/verbalizes HEP/Ed previously given. Plan: [x] Continue current frequency toward long and short term goals. [x] Specific Instructions for subsequent treatments:   To improve balance and strength      Time In: 10:00am           Time Out: 10:50am    Electronically signed by:  Sara Gómez PTA Unable to Assess: Patient left before being evaluated

## 2025-04-08 ENCOUNTER — HOSPITAL ENCOUNTER (OUTPATIENT)
Age: 75
Setting detail: THERAPIES SERIES
Discharge: HOME | End: 2025-04-08
Payer: MEDICARE

## 2025-04-08 PROCEDURE — 95992 CANALITH REPOSITIONING PROC: CPT

## 2025-04-08 PROCEDURE — 97161 PT EVAL LOW COMPLEX 20 MIN: CPT

## 2025-04-08 NOTE — FLOWSHEET NOTE
Tests- With Fixation ([facility]):    Test Result- Normal, Abnormal   Spontaneous Nystagmus N   Gaze Holding Nystagmus N   Smooth Pursuit N   Saccades N   VOR Slow N   VOR Cancellation N     Precautions: Fall  Positional Tests:    Test Nystagmus Duration Symptoms   Yifan Hallpike- R none brief dizziness   [place] Hallpike- L none Short dizziness > R    Modified S/L Test- R      Modified S/L Test- L      Roll Test        Positional Treatments:    BPPV Type Treatment Technique Trials Result Other:   L posterior canalithiasis Trial Modified CRT  for L posterior canalithiasis X 1 Abolished vertigo                   Problems:                                                                                     [x]  1.  Vertigo  [x]  2.  Difficulty walking a straight path                                     [x]  3.  Positive Yifan Hallpike                                               [x]  4.  Static balance deficit: Tinetti  [x]  5.  Dynamic balance deficit: Dynamic Gait Index (DGI),   [x]  6.  Increased Dizziness Handicap Inventory (DHI)         Short Term Goals: (     10        Treatments)  [x] 1. Decreased Vertigo to facilitate bed mobility  [x] 2. Improved gait mechanics, trajectory  [x] 3. Negative Yifan Hallpike  [x] 4. Improve Tinetti score to 26/28 to facilitate transfers  [x] 5. Decreased Dizziness Handicap Inventory (DHI) 10/100 to facilitate ADL's  [x] 6. Demonstrate knowledge of fall prevention    Long Term Goals: (      12        Treatments)  [x] 1.  Improved Dynamic Gait Index(DGI) score to 22/24 to facilitate household chores  [x] 2. Independent with Home Exercise Program (HEP)     Patient Goals: Abolish dizziness    Assessment:  Patient presents with symptoms consistent with Likely L posterior canalithiasis and imbalance  Patient would benefit from skilled physical therapy services in order to address deficits as stated to restore balanced, fall risk, gait, functional mobility, ADL's, household chores    Rehab

## 2025-04-15 ENCOUNTER — HOSPITAL ENCOUNTER (OUTPATIENT)
Age: 75
Setting detail: THERAPIES SERIES
Discharge: HOME | End: 2025-04-15
Payer: MEDICARE

## 2025-04-15 PROCEDURE — 95992 CANALITH REPOSITIONING PROC: CPT

## 2025-04-15 PROCEDURE — 97530 THERAPEUTIC ACTIVITIES: CPT

## 2025-04-15 NOTE — FLOWSHEET NOTE
[x] [facility]  Outpatient Rehabilitation &  Therapy  [address]  P: [phone]  F: [phone]     Physical Therapy Daily Treatment Note    Date:  4/15/2025  Patient Name:  Tuan Orozco    :  1950  MRN: [id number]  Physician: Elisha Zaldivar MD                                Insurance: /[facility] A[employer]: BMN  Medical Diagnosis: TIA G45.9                     Rehab Codes: R42, H81.12, R26.81  Onset date: 3/25/2025                       Next 's appt.: TBD  Visit# / total visits: ; Progress note for Medicare patient due at visit 10     Cancels/No Shows: 0/0    Subjective:  dizziness worse in am. Vision problems being tested. Plans heart monitor and sleep study, neuro consult  Pain:  [] Yes  [x] No Location: N/A Pain Rating: (0-10 scale) 0/10  Pain altered Tx:  [] No  [] Yes  Action:  Comments:    Objective:  Modalities:   Precautions [] No  [x] Yes: Precautions: Fall  Positional Tests:     Test Nystagmus Duration Symptoms   Sycamore Hallpike- R np  dizziness   Yifan Hallpike- L none 20\"     Modified S/L Test- R         Modified S/L Test- L         Roll Test            Positional Treatments:     BPPV Type Treatment Technique Trials Result Other:   L posterior canalithiasis Trial Modified CRT  for L posterior canalithiasis X 3 Abolished vertigo  modA      R sidelying  x 3    modA            Exercises:  Exercise Reps/ Time Weight/ Level Comments   Gait without ' SBA for safety VC for pace                                                                                                                     Other:         Treatment Charges: Mins Units   []  Modalities       []  Ther Exercise     []  Neuromuscular Re-ed     []  Gait Training     []  Manual Therapy     [x]  Ther Activities 10 1   []  Aquatics     []  Vasocompression     []  Cervical Traction     [x]  Other 30 1   Total Billable time 40 min 2          Assessment:  Patient presents with symptoms consistent with Likely L

## 2025-04-24 ENCOUNTER — HOSPITAL ENCOUNTER (OUTPATIENT)
Age: 75
Setting detail: THERAPIES SERIES
Discharge: HOME | End: 2025-04-24
Payer: MEDICARE

## 2025-04-24 PROCEDURE — 97112 NEUROMUSCULAR REEDUCATION: CPT

## 2025-04-24 PROCEDURE — 95992 CANALITH REPOSITIONING PROC: CPT

## 2025-04-24 PROCEDURE — 97530 THERAPEUTIC ACTIVITIES: CPT

## 2025-06-30 RX ORDER — GLIPIZIDE 10 MG/1
10 TABLET ORAL DAILY
COMMUNITY

## 2025-06-30 RX ORDER — METFORMIN HYDROCHLORIDE 750 MG/1
750 TABLET, EXTENDED RELEASE ORAL
COMMUNITY

## 2025-06-30 RX ORDER — FLUTICASONE PROPIONATE 50 MCG
2 SPRAY, SUSPENSION (ML) NASAL DAILY
COMMUNITY

## 2025-06-30 RX ORDER — ATORVASTATIN CALCIUM 40 MG/1
40 TABLET, FILM COATED ORAL DAILY
COMMUNITY

## 2025-06-30 RX ORDER — LANOLIN ALCOHOL/MO/W.PET/CERES
1000 CREAM (GRAM) TOPICAL DAILY
COMMUNITY

## 2025-06-30 RX ORDER — CLOPIDOGREL BISULFATE 75 MG/1
75 TABLET ORAL DAILY
COMMUNITY

## 2025-06-30 RX ORDER — PIOGLITAZONE 15 MG/1
15 TABLET ORAL DAILY
COMMUNITY

## 2025-06-30 RX ORDER — GLIPIZIDE 5 MG/1
5 TABLET ORAL NIGHTLY
COMMUNITY

## 2025-07-02 ENCOUNTER — TELEPHONE (OUTPATIENT)
Age: 75
End: 2025-07-02

## 2025-07-02 NOTE — TELEPHONE ENCOUNTER
Pre-procedure phone call completed. Reviewed arrival time  1330 , Entrance C, may have a light breakfast and take morning medications. Patient verbalizes understanding.

## 2025-07-03 ENCOUNTER — HOSPITAL ENCOUNTER (OUTPATIENT)
Age: 75
Setting detail: OUTPATIENT SURGERY
Discharge: HOME | End: 2025-07-03
Attending: INTERNAL MEDICINE | Admitting: INTERNAL MEDICINE
Payer: MEDICARE

## 2025-07-03 VITALS
OXYGEN SATURATION: 99 % | RESPIRATION RATE: 10 BRPM | TEMPERATURE: 98 F | HEIGHT: 70 IN | HEART RATE: 62 BPM | SYSTOLIC BLOOD PRESSURE: 125 MMHG | WEIGHT: 287 LBS | DIASTOLIC BLOOD PRESSURE: 71 MMHG | BODY MASS INDEX: 41.09 KG/M2

## 2025-07-03 DIAGNOSIS — I47.10 PAROXYSMAL SUPRAVENTRICULAR TACHYCARDIA: ICD-10-CM

## 2025-07-03 DIAGNOSIS — I63.9 CRYPTOGENIC STROKE (HCC): ICD-10-CM

## 2025-07-03 DIAGNOSIS — G45.9 TRANSIENT ISCHEMIC ATTACK: ICD-10-CM

## 2025-07-03 LAB — ECHO BSA: 2.54 M2

## 2025-07-03 PROCEDURE — 33285 INSJ SUBQ CAR RHYTHM MNTR: CPT | Performed by: INTERNAL MEDICINE

## 2025-07-03 PROCEDURE — 7100000010 HC PHASE II RECOVERY - FIRST 15 MIN: Performed by: INTERNAL MEDICINE

## 2025-07-03 PROCEDURE — C1764 EVENT RECORDER, CARDIAC: HCPCS | Performed by: INTERNAL MEDICINE

## 2025-07-03 PROCEDURE — 6360000002 HC RX W HCPCS: Performed by: INTERNAL MEDICINE

## 2025-07-03 PROCEDURE — 7100000011 HC PHASE II RECOVERY - ADDTL 15 MIN: Performed by: INTERNAL MEDICINE

## 2025-07-03 PROCEDURE — 2709999900 HC NON-CHARGEABLE SUPPLY: Performed by: INTERNAL MEDICINE

## 2025-07-03 PROCEDURE — C1892 INTRO/SHEATH,FIXED,PEEL-AWAY: HCPCS | Performed by: INTERNAL MEDICINE

## 2025-07-03 DEVICE — ICM LNQ22 LINQ II PRIME US
Type: IMPLANTABLE DEVICE | Status: FUNCTIONAL
Brand: LINQ II™

## 2025-07-03 RX ORDER — SODIUM CHLORIDE 9 MG/ML
INJECTION, SOLUTION INTRAVENOUS PRN
Status: DISCONTINUED | OUTPATIENT
Start: 2025-07-03 | End: 2025-07-03 | Stop reason: HOSPADM

## 2025-07-03 RX ORDER — SODIUM CHLORIDE 0.9 % (FLUSH) 0.9 %
5-40 SYRINGE (ML) INJECTION PRN
Status: DISCONTINUED | OUTPATIENT
Start: 2025-07-03 | End: 2025-07-03 | Stop reason: HOSPADM

## 2025-07-03 RX ORDER — LIDOCAINE HYDROCHLORIDE 10 MG/ML
INJECTION, SOLUTION INFILTRATION; PERINEURAL PRN
Status: DISCONTINUED | OUTPATIENT
Start: 2025-07-03 | End: 2025-07-03 | Stop reason: HOSPADM

## 2025-07-03 RX ORDER — SODIUM CHLORIDE 0.9 % (FLUSH) 0.9 %
5-40 SYRINGE (ML) INJECTION EVERY 12 HOURS SCHEDULED
Status: DISCONTINUED | OUTPATIENT
Start: 2025-07-03 | End: 2025-07-03 | Stop reason: HOSPADM

## 2025-07-03 NOTE — INTERVAL H&P NOTE
Update History & Physical    The patient's History and Physical of June 30, 2025 was reviewed with the patient and I examined the patient. There was no change. The surgical site was confirmed by the patient and me.     Plan: The risks, benefits, expected outcome, and alternative to the recommended procedure have been discussed with the patient. Patient understands and wants to proceed with the procedure.     Electronically signed by Tricia Ortega MD on 7/3/2025 at 2:24 PM

## 2025-07-03 NOTE — DISCHARGE INSTRUCTIONS
INTERNAL LOOP RECORDER /       - NO DRIVING 24 HOURS IF SEDATION RECEIVED    - DRESSING TO BE REMOVED at follow-up appointment AND LEFT OPEN TO THE AIR    -GLUE USED TO CLOSE INCISION AND WILL PEEL OFF DO NOT PICK AT IT     -OK TO SHOWER THE FOLLOWING DAY BUT DO NOT LET WATER HIT DIRECTLY ON SITE FOR ANY LENGTH OF TIME FOR FIRST WEEK    -DO NOT RUB/SCRUB SITE    -NO SWIMMING IN POOLS/HOT TUBS FOR 7 TO 10 DAYS     -AVOID CLOTHING THAT MIGHT RUB ON INCISION    -WATCH FOR SIGNS OF INFECTION - REDNESS, SWELLING, DRAINAGE AT SITE AND /OR TEMPERATURE GREATER THAN 101    NOTIFY YOUR PHYSICIAN IMMEDIATELY IF SIGNS OF INFECTION OTHERWISE  -CALL FOR FURTHER FOLLOW UP                      WHAT IS AN INSERTABLE CARDIAC MONITOR    An insertable cardiac monitor is a small device that continuously monitors heart rhythms and records them automatically or by using a hand-held patient assistant. The device is inserted just beneath the skin, during a short outpatient procedure.    HOW DOES AN INSERTABLE CARDIAC MONITOR WORK?  An implantable loop recorder, like a Holter monitor and event monitor, reads your heart’s electrical activity. Your heart has an electrical system that functions like a natural pacemaker. Electrical signals travel through the chambers of the heart to cause it to beat. If the heart’s natural electrical system is not functioning correctly, an abnormal heartbeat (arrhythmia) may result.  The implantable loop recorder continuously monitors the electrical activity of the heart and automatically begins recording information when the heart’s patterns change. Alternately, you may be instructed by your physician to turn on the device’s recording function by using an external“activator,” a hand-held device that you hold over the site on your chest where the recorder has been implanted.    Once the cardiac monitor is inserted, it is programmed to continuously monitor your heart’s activity.     IF I GET AN INSERTABLE CARDIAC

## (undated) DEVICE — ELECTRODE PT RET AD L9FT HI MOIST COND ADH HYDRGEL CORDED

## (undated) DEVICE — MEDI-TRACE CADENCE ADULT, DEFIBRILLATION ELECTRODE -RTS  (10 PR/PK) - PHYSIO-CONTROL: Brand: MEDI-TRACE CADENCE

## (undated) DEVICE — Z DISCONTINUED USE 2859063 SUTURE VICRYL 3-0 L27IN ABSRB UD PSL L30MM 1/2 CIR TAPERPOINT J502H

## (undated) DEVICE — STRAP ARMBRD W1.5XL32IN FOAM STR YET SFT W/ HK AND LOOP

## (undated) DEVICE — SUTURE VICRYL 2-0 L27IN ABSRB CT BRAID COAT UD J275H

## (undated) DEVICE — SPONGE,LAP,18"X18",STD,XR,ST,5/PK,40PK/C: Brand: MEDLINE

## (undated) DEVICE — KIT MICRO INTRO 4FR STIFF 40CM NIGHTENALL TUNGSTEN 7SMT

## (undated) DEVICE — SOLUTION IV 0.9% NACL IRRIGATION 3000ML BAG

## (undated) DEVICE — Device